# Patient Record
Sex: FEMALE | Race: WHITE | Employment: FULL TIME | URBAN - METROPOLITAN AREA
[De-identification: names, ages, dates, MRNs, and addresses within clinical notes are randomized per-mention and may not be internally consistent; named-entity substitution may affect disease eponyms.]

---

## 2022-06-23 ENCOUNTER — HOSPITAL ENCOUNTER (OUTPATIENT)
Age: 67
Discharge: HOME | End: 2022-06-23
Payer: COMMERCIAL

## 2022-06-23 DIAGNOSIS — C53.9 SQUAMOUS CELL CARCINOMA OF CERVIX (HCC): ICD-10-CM

## 2022-06-23 LAB
ANION GAP SERPL CALCULATED.3IONS-SCNC: 15 MMOL/L (ref 7–16)
BUN BLDV-MCNC: 36 MG/DL (ref 6–23)
CALCIUM SERPL-MCNC: 9.5 MG/DL (ref 8.6–10.2)
CHLORIDE BLD-SCNC: 98 MMOL/L (ref 98–107)
CO2: 22 MMOL/L (ref 22–29)
CREAT SERPL-MCNC: 2 MG/DL (ref 0.5–1)
GFR AFRICAN AMERICAN: 30
GFR NON-AFRICAN AMERICAN: 25 ML/MIN/1.73
GLUCOSE BLD-MCNC: 106 MG/DL (ref 74–99)
POTASSIUM SERPL-SCNC: 5 MMOL/L (ref 3.5–5)
SODIUM BLD-SCNC: 135 MMOL/L (ref 132–146)

## 2022-06-23 PROCEDURE — 80048 BASIC METABOLIC PNL TOTAL CA: CPT

## 2022-06-23 PROCEDURE — 36415 COLL VENOUS BLD VENIPUNCTURE: CPT

## 2022-06-30 ENCOUNTER — HOSPITAL ENCOUNTER (OUTPATIENT)
Age: 67
Discharge: HOME | End: 2022-06-30
Payer: COMMERCIAL

## 2022-06-30 ENCOUNTER — HOSPITAL ENCOUNTER (OUTPATIENT)
Dept: MRI IMAGING | Age: 67
Discharge: HOME | End: 2022-07-02
Payer: COMMERCIAL

## 2022-06-30 ENCOUNTER — HOSPITAL ENCOUNTER (OUTPATIENT)
Dept: PET IMAGING | Age: 67
Discharge: HOME | End: 2022-07-02
Payer: COMMERCIAL

## 2022-06-30 DIAGNOSIS — C53.9 SQUAMOUS CELL CARCINOMA OF CERVIX (HCC): ICD-10-CM

## 2022-06-30 LAB
BUN BLDV-MCNC: 34 MG/DL (ref 6–23)
CREAT SERPL-MCNC: 1.8 MG/DL (ref 0.5–1)
GFR AFRICAN AMERICAN: 34
GFR NON-AFRICAN AMERICAN: 28 ML/MIN/1.73
METER GLUCOSE: 92 MG/DL (ref 74–99)

## 2022-06-30 PROCEDURE — 84520 ASSAY OF UREA NITROGEN: CPT

## 2022-06-30 PROCEDURE — G1010 CDSM STANSON: HCPCS

## 2022-06-30 PROCEDURE — 82565 ASSAY OF CREATININE: CPT

## 2022-06-30 PROCEDURE — 36415 COLL VENOUS BLD VENIPUNCTURE: CPT

## 2022-06-30 PROCEDURE — 82962 GLUCOSE BLOOD TEST: CPT

## 2022-06-30 PROCEDURE — 3430000000 HC RX DIAGNOSTIC RADIOPHARMACEUTICAL: Performed by: RADIOLOGY

## 2022-06-30 PROCEDURE — A9552 F18 FDG: HCPCS | Performed by: RADIOLOGY

## 2022-06-30 RX ORDER — FLUDEOXYGLUCOSE F 18 200 MCI/ML
15 INJECTION, SOLUTION INTRAVENOUS
Status: COMPLETED | OUTPATIENT
Start: 2022-06-30 | End: 2022-06-30

## 2022-06-30 RX ADMIN — FLUDEOXYGLUCOSE F 18 15 MILLICURIE: 200 INJECTION, SOLUTION INTRAVENOUS at 12:28

## 2022-07-07 ENCOUNTER — HOSPITAL ENCOUNTER (OUTPATIENT)
Dept: MRI IMAGING | Age: 67
Discharge: HOME | End: 2022-07-09
Payer: COMMERCIAL

## 2022-07-07 VITALS — WEIGHT: 210.98 LBS | BODY MASS INDEX: 34.05 KG/M2

## 2022-07-07 DIAGNOSIS — C53.9 SQUAMOUS CELL CARCINOMA OF CERVIX (HCC): ICD-10-CM

## 2022-07-07 PROCEDURE — 72197 MRI PELVIS W/O & W/DYE: CPT

## 2022-07-07 PROCEDURE — 2580000003 HC RX 258

## 2022-07-07 PROCEDURE — A9577 INJ MULTIHANCE: HCPCS | Performed by: RADIOLOGY

## 2022-07-07 PROCEDURE — 6360000004 HC RX CONTRAST MEDICATION: Performed by: RADIOLOGY

## 2022-07-07 RX ORDER — 0.9 % SODIUM CHLORIDE 0.9 %
3 INTRAVENOUS SOLUTION INTRAVENOUS ONCE
Status: COMPLETED | OUTPATIENT
Start: 2022-07-07 | End: 2022-07-07

## 2022-07-07 RX ORDER — SODIUM CHLORIDE 9 MG/ML
INJECTION, SOLUTION INTRAVENOUS CONTINUOUS
Status: ACTIVE | OUTPATIENT
Start: 2022-07-07 | End: 2022-07-07

## 2022-07-07 RX ORDER — 0.9 % SODIUM CHLORIDE 0.9 %
2 INTRAVENOUS SOLUTION INTRAVENOUS ONCE
Status: DISCONTINUED | OUTPATIENT
Start: 2022-07-07 | End: 2022-07-07

## 2022-07-07 RX ADMIN — GADOBENATE DIMEGLUMINE 19 ML: 529 INJECTION, SOLUTION INTRAVENOUS at 12:30

## 2022-07-07 RX ADMIN — SODIUM CHLORIDE 287 ML: 9 INJECTION, SOLUTION INTRAVENOUS at 10:11

## 2022-07-07 RX ADMIN — SODIUM CHLORIDE: 9 INJECTION, SOLUTION INTRAVENOUS at 12:30

## 2022-07-07 NOTE — FLOWSHEET NOTE
Patient arrived from home to radiology for hydration prior to scheduled MRI. Patient weighed upon arrival and documented in the computer. Weight based bolus calculated. Iv inserted to right antecubital #20 gauge. Infusion started and call light given to patient. MRI notified that patient is here and infusion started.

## 2022-07-19 PROBLEM — Z98.890 POST-OPERATIVE STATE: Status: ACTIVE | Noted: 2022-07-19

## 2022-08-08 ENCOUNTER — HOSPITAL ENCOUNTER (OUTPATIENT)
Dept: RADIATION ONCOLOGY | Age: 67
Discharge: HOME | End: 2022-08-08
Payer: COMMERCIAL

## 2022-08-08 ENCOUNTER — TELEPHONE (OUTPATIENT)
Dept: CASE MANAGEMENT | Age: 67
End: 2022-08-08

## 2022-08-08 VITALS
OXYGEN SATURATION: 98 % | RESPIRATION RATE: 18 BRPM | TEMPERATURE: 97.5 F | HEART RATE: 96 BPM | BODY MASS INDEX: 33.6 KG/M2 | WEIGHT: 208.2 LBS

## 2022-08-08 PROCEDURE — 99204 OFFICE O/P NEW MOD 45 MIN: CPT | Performed by: RADIOLOGY

## 2022-08-08 PROCEDURE — 99205 OFFICE O/P NEW HI 60 MIN: CPT

## 2022-08-08 ASSESSMENT — PAIN DESCRIPTION - FREQUENCY: FREQUENCY: INTERMITTENT

## 2022-08-08 ASSESSMENT — PAIN DESCRIPTION - LOCATION: LOCATION: PELVIS

## 2022-08-08 ASSESSMENT — PAIN SCALES - GENERAL: PAINLEVEL_OUTOF10: 3

## 2022-08-08 ASSESSMENT — PAIN DESCRIPTION - DESCRIPTORS: DESCRIPTORS: TENDER

## 2022-08-08 NOTE — TELEPHONE ENCOUNTER
Met with patient and her daughter Janette Robert during her initial consultation with Dr. Raisa Lucas for radiation therapy for her cervical cancer diagnosis. Introduced myself and explained my role with cancer patients receiving treatment within our cancer centers. Patient was friendly and receptive. States that all of her questions and concerns were fully addressed during her consultation appointment with the radiation therapy nurse and physician. Denies any problems with insurance coverage for medication or transportation for the daily treatments. Reviewed additional ancillary services available at the center. Denies any referral needs at this time. Patient is to see Dr. Marcio Schofield to discuss possible concurrent chemotherapy, she is requesting to be seen in [place] [address]. I told her to expect a call from our staff within the next few days to set up this appointment. She is aware that she needs a CT Simulation scheduled. RT nurse Minnie Tillman is working to set that appointment up while patient is being seen for consult. Patient provided with written literature of ACS: Cervical Cancer, printouts from [facility] on Cervical Cancer and radiation therapy, [facility] handout, and . I told patient that I also work with Dr. Marcio Schofield and will continue to follow with her during those appointments and will be available for any questions or concerns she has. Provided patient with my contact information and office hours. Patient verbalizes understanding and appreciative of nurse navigator visit. Emotional support provided and greater than 25 minutes spent with patient. Nurse navigator will continue to follow.  EDGARDO LemaN, RN, Oncology Nurse Navigator

## 2022-08-08 NOTE — PROGRESS NOTES
Ignatius Harada  1955 78 y.o. Referring Physician: Dr. Mark Forrest    PCP: JULIO Car     Vitals:    22 0903   Pulse: 96   Resp: 18   Temp: 97.5 °F (36.4 °C)   SpO2: 98%        Wt Readings from Last 3 Encounters:   22 208 lb 3.2 oz (94.4 kg)   22 209 lb (94.8 kg)   22 207 lb (93.9 kg)        Body mass index is 33.6 kg/m². Chief Complaint: No chief complaint on file. Cancer Staging  No matching staging information was found for the patient. Prior Radiation Therapy? NO    Concurrent Chemo/radiation? NO    Prior Chemotherapy? NO    Prior Hormonal Therapy? NO    Head and Neck Cancer? No, patient does NOT have HN cancer. LMP: 47    Age at first Menses: 6    : 1    Para: 1        Current Outpatient Medications   Medication Sig Dispense Refill    acetaminophen (TYLENOL) 500 MG tablet Take 2 tablets by mouth every 6 hours as needed for Pain 60 tablet 0    docusate sodium (COLACE) 100 MG capsule Take 1 capsule by mouth 2 times daily as needed for Constipation 60 capsule 0    apixaban (ELIQUIS) 2.5 MG TABS tablet Take 1 tablet by mouth in the morning and 1 tablet before bedtime. Do all this for 28 days. 56 tablet 0    lisinopril-hydroCHLOROthiazide (PRINZIDE;ZESTORETIC) 20-12.5 MG per tablet Take 1 tablet by mouth daily      OMEPRAZOLE PO Take 20 mg by mouth daily      Levothyroxine Sodium (SYNTHROID PO) Take by mouth daily      NONFORMULARY at bedtime Sleeping Pill      ALPRAZolam (XANAX) 0.5 MG tablet Take 1 tablet by mouth every 8 hours as needed for Anxiety for up to 30 days.  30 tablet 0    lisinopril-hydroCHLOROthiazide (PRINZIDE;ZESTORETIC) 20-25 MG per tablet TAKE 1 TABLET BY MOUTH IN THE MORNING      omeprazole (PRILOSEC) 20 MG delayed release capsule TAKE 1 CAPSULE BY MOUTH IN THE MORNING BEFORE BREAKFAST      traZODone (DESYREL) 50 MG tablet       ALPRAZolam (XANAX) 0.25 MG tablet       levothyroxine (SYNTHROID) 50 MCG tablet Take 50 mcg by mouth Daily       No current facility-administered medications for this encounter. Past Medical History:   Diagnosis Date    Abnormal Pap smear of cervix     Acid reflux     Arthritis     Hemorrhage 1982    s/p vaginal delivery     History of blood transfusion     Hypertension     Skin cancer     Thyroid disease        Past Surgical History:   Procedure Laterality Date    SKIN CANCER EXCISION      THYROID SURGERY      part of thyroid removal    THYROIDECTOMY, PARTIAL  [id number]       Family History   Problem Relation Age of Onset    Cancer Mother         Lung    Lung Cancer Mother        Social History     Socioeconomic History    Marital status:      Spouse name: Not on file    Number of children: 1    Years of education: Not on file    Highest education level: Not on file   Occupational History    Not on file   Tobacco Use    Smoking status: Never    Smokeless tobacco: Never   Vaping Use    Vaping Use: Never used   Substance and Sexual Activity    Alcohol use: Never    Drug use: Never    Sexual activity: Not on file   Other Topics Concern    Not on file   Social History Narrative    ** Merged History Encounter **          Social Determinants of Health     Financial Resource Strain: Not on file   Food Insecurity: Not on file   Transportation Needs: Not on file   Physical Activity: Not on file   Stress: Not on file   Social Connections: Not on file   Intimate Partner Violence: Not on file   Housing Stability: Not on file           Occupation:   Retired:  NO        REVIEW OF SYSTEMS: <<For Level 5, 10 or more systems>>   Patient is seen in consult today with her daughter, Thomas Castillo to discuss RT for her cervical cancer. Patient states that her cervical cancer was found back in May from an abnormal pap smear done on 05/12/22 for which pathology came back positive for malignancy, invasive nonkeratinizing Squamous cell carcinoma.  Patient then had a PET scan done on 06/30/22 which showed focal within patient's reach  6. Chair/bed in low position, stretcher/bed with siderails up except when performing patient care activities  7. Educate patient/family/caregiver on falls prevention  8. Falls risk precaution (Yellow sticker Level III) placed on patient chart           MALNUTRITION RISK SCREENING ASSESSMENT    Instructions:  Assess the patient and enter the appropriate indicators that are present for nutrition risk identification. Total the numbers entered and assign a risk score. Follow the appropriate action for total score listed below. Assessment   Date  8/8/2022     Have you lost weight without trying? 0- No     Have you been eating poorly because of a decreased appetite? 0- No   3. Do you have a diagnosis of head and neck cancer? 0- No                                                                                    TOTAL 0          Score of 0-1: No action  Score 2 or greater:   For Non-Diabetic Patient: Recommend adding Ensure Complete 2 x daily and provide patient with Ensure wellness bag with coupons  For Diabetic Patient: Recommend adding Glucerna Shake 2 x daily and provide patient with Glucerna Wellness bag with coupons  Route to the dietitian via [address]    Are you having difficulty performing daily routine tasks due to fatigue or weakness (ie: bathing/showering, dressing, housework, meal prep, work, , etc): No     Do you have any arm flexibility/ROM restrictions, swelling or pain that limit activity: No     Any changes in memory, attention/focus that impact daily activities: No     Do you avoid participation in leisure/social activity due to weakness, fatigue or pain: No     ARE ANY OF THE ABOVE ARE ANSWERED YES: No          PT ASSESSMENT FOR REFERRAL    Have you had any recent falls in the past 2 months: No     Do you have difficulty going up/down stairs: No     Are you having difficulty walking: No     Do you often hold onto furniture/environmental supports or feel off balance when you are walking: No     Do you need to take rest breaks when you are walking: No     Any pain on a scale of 1-10 that limits your mobility: Yes 3/10    ARE ANY OF THE ABOVE ARE ANSWERED YES: Yes - but NO PT referral request sent due to patient refusal.                   PELVIC FLOOR DYSFUNCTION SCREENING ASSESSMENT    - Do you have any pelvic pain? Yes     - Do you have any fecal constipation or fecal incontinence? No     - Do you have any urinary incontinence or difficulty starting to urinate? No     ARE ANY OF THE ABOVE ARE ANSWERED YES: No          PREHAB AUDIOLOGY REFERRAL    - Is patient planned to receive Cisplatin? No. This patient is not planned to start Cisplatin. - Is patient planned to receive radiation therapy that may be directed toward auditory canals or nerves? No. Patient is not planned to start radiation therapy to auditory canals or nerves. - Is patient complaining of new onset hearing loss? No. Patient is not complaining of new onset hearing loss. Patient education given on RT to the Pelvis. The patient expresses understanding and acceptance of instructions.  Sol Guerra RN 8/8/2022 9:11 AM           Sol Guerra RN

## 2022-08-08 NOTE — PROGRESS NOTES
Radiation Oncology Consult Note         8/8/2022    Chase Spear  78 y.o.   1955    REFERRING PROVIDER: Nely Laird    PCP:  JULIO Smith    CHIEF COMPLAINT: Have a recent diagnosis of cervical cancer for which I underwent surgery, I am here to find out if I need radiation therapy as well. 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 2.5 cm in maximum dimension, intermediate risk, with 2 positive Sedlis factors (more than two third cervical stromal invasion, focal lymphovascular invasion)    STAGING: Cancer Staging  No matching staging information was found for the patient. HISTORY OF PRESENT ILLNESS: Ms. Chase Spear  is a 78y.o. year old female who in May had an abnormal Pap smear. She underwent a cervical biopsy on 6/3/2022 that shows an invasive nonkeratinizing squamous cell carcinoma, p16 positive. On 7/19/2022 the patient underwent radical hysterectomy with lymph ryley sampling under the care of of Dr. Augusto Muñoz. Pathology report shows a moderately differentiated squamous cell carcinoma invading at least into the middle third and closely approaching the outer third of the cervical stroma. There were 2 incidental parametrial lymph nodes negative for metastasis and 5 pelvic lymph nodes negative for metastasis as well. The tumor had maximum dimension of 2.5 cm. With depth of stromal invasion of 9 mm. Lymphovascular invasion was present, focal.  All margins were negative for invasive carcinoma, the closest margin to invasive carcinoma was the ectocervical, radial/circumferential and the distance from invasive carcinoma to closest margins was 4 mm. High-grade squamous intraepithelial lesion present at margin (ectocervical at 3:00). Pathological stage IB2, according to 2018 FIGO.   The patient had an uneventful postoperative period and was then referred to me by Dr. Augusto Muñoz lymphadenopathy. Musculoskeletal: Ambulates without assistance. Normal curvature of the spine. No gross muscle weakness. Muscle size, tone and strength are normal. No involuntary movements. Extremities:  No lower extremity edema, ulcerations, tenderness, varicosities or erythema. Coordination appears adequate. Sensory function appears intact. Skin:  Warm and dry. Examination of color, turgor and pigmentation was relatively normal. No bruises or skin rashes. Neurological/Psychiatric: General behavior, level of consciousness, thought content is normal. The patient's emotional status is normal.  Cranial nerves II-XII are grossly intact. RADIATION SAFETY AND ONCOLOGIC TREATMENT SUPPORT:    - Previous radiation history:  No  - History of autoimmune or connective tissue disease:  No  - Nutritional support/ PEG:  Not applicable  - Dental evaluation:  Not applicable  -  requested:  Not asked for.  - Oncology Nurse navigator requested:  - Transportation for daily treatment:  Self    TUMOR MARKERS: No results found for: PSA, CEA, , [id number],     IMAGING REVIEWED:  I personally reviewed the images of the preoperative MRI as well as of the staging PET/CT    PATHOLOGY REVIEWED:  DIAGNOSIS:     A. UTERUS, RADICAL HYSTERECTOMY AND BILATERAL SALPINGO-OOPHORECTOMY -   INVASIVE, MODERATELY DIFFERENTIATED SQUAMOUS CELL CARCINOMA OF THE   CERVIX   - SEE SUMMARY BELOW   - BACKGROUND UTERUS WITH ATROPHIC ENDOMETRIUM, INCIDENTAL ENDOMETRIAL   POLYP, LEIOMYOMATA, AND BENIGN FALLOPIAN TUBES AND OVARIES   - 2 INCIDENTAL PARAMETRIAL LYMPH NODES, NEGATIVE FOR METASTASIS (0/2)       B. RIGHT PELVIC LYMPH NODES, SAMPLING - 3 LYMPH NODES, NEGATIVE FOR   METASTASIS (0/3)       C.   LEFT PELVIC LYMPH NODES, SAMPLING - 2 LYMPH NODES, NEGATIVE FOR   METASTASIS (0/2)       SPECIMEN   Procedure: Radical hysterectomy   TUMOR   Tumor Size:  Greatest Dimension (Centimeters) - 2.5 cm   Histologic Type:  Squamous cell carcinoma, HPV-associated   Histologic Grade:  G2, moderately differentiated   Stromal Invasion   Depth of Stromal Invasion:  9 mm   Other Tissue / Organ Involvement:  Not identified   Lymphovascular Invasion:  Present, focal   MARGINS   Margin Status for Invasive Carcinoma: All margins negative for   invasive carcinoma   Closest Margin(s) to Invasive Carcinoma:  Ectocervical, Radial /   circumferential   Distance from Invasive Carcinoma to Closest Margin:  4 mm   Margin Status for HSIL or AIS:   High-grade squamous intraepithelial lesion (HSIL) present at   margin   Margin(s) Involved by HSIL:  Ectocervical - 3:00. Other   ectocervical margins are denuded and cannot be commented upon   REGIONAL LYMPH NODES   Regional Lymph Node Status: All regional lymph nodes negative for   tumor cells   Lymph Nodes Examined   Total Number of Pelvic Nodes Examined:  5   Number of Pelvic [place] Nodes Examined:  5   Total Number of Para-aortic Nodes Examined:  0   Regional Lymph Node Comment:  2 additional incidental parametrial   lymph nodes, negative for metastasis   PATHOLOGIC STAGE CLASSIFICATION (pTNM, AJCC 9th Version)   Reporting of pT, pN, and (when applicable) pM categories is based on   information available to the pathologist at the time the report is   issued. As per the AJCC (Chapter 1, 8th Ed.) it is the managing   physician's responsibility to establish the final pathologic stage   based upon all pertinent information, including but potentially not   limited to this pathology report.    pT Category:  pT1b2   Regional Lymph Node Modifier:  (sn)   pN Category:  pN0   FIGO STAGE   FIGO Stage (2018 FIGO Cancer Report):  IB2   REPRESENTATIVE TUMOR BLOCK(S):  A7     IMPRESSION:   The patient with an early stage p16 positive squamous cell carcinoma of the cervix with intermediate risk, due to the presence of 2 Sedlis factors, is a good candidate for course of adjuvant radiation therapy to the pelvis plus minus concurrent chemotherapy. DISCUSSION/PLAN:     I have discussed with the patient the goals of the adjuvant radiation therapy in terms of increasing the local control and the progression free survival in intermediate risk patient with 2 or more Sedlis risk factors. Then I have explained to the patient the side effects of adjuvant radiation therapy to the pelvis with or without the addition of concurrent chemotherapy. According to NCCN guidelines the addition of chemotherapy in this specific case has a level 2 of evidence, but given the relatively low toxicity of weekly cisplatin, many experts in the field tend to favor the combination with concurrent chemotherapy on the basis of the ongoing randomized trial  where the experimental arm of concurrent adjuvant chemoradiation is compared to the standard arm of adjuvant radiation therapy alone. I have discussed the [facility] [address] study as well as the characteristic of the ongoing  with the patient and her daughter. They have an appointment with medical oncology to further discuss the issue of concurrent chemotherapy, in the near future. As far as the radiation treatment is concerned, at the end of the discussion the patient and her daughter voiced understanding and were agreeable with the plan. The patient signed a consent. I am planning for 45 Gray at 1.8 Gray per fraction to the pelvis with or without concurrent weekly cisplatin. NCCN guidelines, version 2020 is used to help review treatment recommendations. Procedures and process involved with CT simulation and daily radiation treatments were explained. Toxicities, both expected and less common, were reviewed in detail. Questions were answered to their apparent satisfaction. Thank you for the opportunity to participate in multidisciplinary management of this remarkable and pleasant patient.       Cesario Cardoso MD    Department of Radiation Oncology  [address] [place] [address] [facility] [place]: [phone] ([facility]: [phone])  [address] [place] [address] [facility]: [phone] ([facility]: [phone])  [facility]) [facility]:  [phone] ([facility]:  [phone])

## 2022-08-10 ENCOUNTER — HOSPITAL ENCOUNTER (OUTPATIENT)
Dept: INFUSION THERAPY | Age: 67
Discharge: HOME | End: 2022-08-10
Payer: COMMERCIAL

## 2022-08-10 ENCOUNTER — OFFICE VISIT (OUTPATIENT)
Dept: ONCOLOGY | Age: 67
End: 2022-08-10
Payer: COMMERCIAL

## 2022-08-10 VITALS
HEART RATE: 105 BPM | SYSTOLIC BLOOD PRESSURE: 141 MMHG | TEMPERATURE: 97.6 F | WEIGHT: 207 LBS | BODY MASS INDEX: 33.27 KG/M2 | HEIGHT: 66 IN | OXYGEN SATURATION: 99 % | DIASTOLIC BLOOD PRESSURE: 77 MMHG

## 2022-08-10 DIAGNOSIS — C53.8 MALIGNANT NEOPLASM OF OVERLAPPING SITES OF CERVIX (HCC): Primary | ICD-10-CM

## 2022-08-10 DIAGNOSIS — C53.8 MALIGNANT NEOPLASM OF OVERLAPPING SITES OF CERVIX (HCC): ICD-10-CM

## 2022-08-10 LAB
ALBUMIN SERPL-MCNC: 4.5 G/DL (ref 3.5–5.2)
ALP BLD-CCNC: 77 U/L (ref 35–104)
ALT SERPL-CCNC: 12 U/L (ref 0–32)
ANION GAP SERPL CALCULATED.3IONS-SCNC: 15 MMOL/L (ref 7–16)
AST SERPL-CCNC: 14 U/L (ref 0–31)
BASOPHILS ABSOLUTE: 0.05 E9/L (ref 0–0.2)
BASOPHILS RELATIVE PERCENT: 0.5 % (ref 0–2)
BILIRUB SERPL-MCNC: 0.3 MG/DL (ref 0–1.2)
BUN BLDV-MCNC: 27 MG/DL (ref 6–23)
CALCIUM SERPL-MCNC: 10 MG/DL (ref 8.6–10.2)
CHLORIDE BLD-SCNC: 100 MMOL/L (ref 98–107)
CO2: 24 MMOL/L (ref 22–29)
CREAT SERPL-MCNC: 1.5 MG/DL (ref 0.5–1)
EOSINOPHILS ABSOLUTE: 0.21 E9/L (ref 0.05–0.5)
EOSINOPHILS RELATIVE PERCENT: 2.3 % (ref 0–6)
GFR AFRICAN AMERICAN: 42
GFR NON-AFRICAN AMERICAN: 35 ML/MIN/1.73
GLUCOSE BLD-MCNC: 102 MG/DL (ref 74–99)
HCT VFR BLD CALC: 37.3 % (ref 34–48)
HEMOGLOBIN: 12.2 G/DL (ref 11.5–15.5)
IMMATURE GRANULOCYTES #: 0.03 E9/L
IMMATURE GRANULOCYTES %: 0.3 % (ref 0–5)
LYMPHOCYTES ABSOLUTE: 2.46 E9/L (ref 1.5–4)
LYMPHOCYTES RELATIVE PERCENT: 26.6 % (ref 20–42)
MCH RBC QN AUTO: 30.3 PG (ref 26–35)
MCHC RBC AUTO-ENTMCNC: 32.7 % (ref 32–34.5)
MCV RBC AUTO: 92.8 FL (ref 80–99.9)
MONOCYTES ABSOLUTE: 0.56 E9/L (ref 0.1–0.95)
MONOCYTES RELATIVE PERCENT: 6.1 % (ref 2–12)
NEUTROPHILS ABSOLUTE: 5.93 E9/L (ref 1.8–7.3)
NEUTROPHILS RELATIVE PERCENT: 64.2 % (ref 43–80)
PDW BLD-RTO: 13.7 FL (ref 11.5–15)
PLATELET # BLD: 357 E9/L (ref 130–450)
PMV BLD AUTO: 10.9 FL (ref 7–12)
POTASSIUM SERPL-SCNC: 4.5 MMOL/L (ref 3.5–5)
RBC # BLD: 4.02 E12/L (ref 3.5–5.5)
SODIUM BLD-SCNC: 139 MMOL/L (ref 132–146)
TOTAL PROTEIN: 7.5 G/DL (ref 6.4–8.3)
WBC # BLD: 9.2 E9/L (ref 4.5–11.5)

## 2022-08-10 PROCEDURE — 99214 OFFICE O/P EST MOD 30 MIN: CPT | Performed by: INTERNAL MEDICINE

## 2022-08-10 PROCEDURE — 36415 COLL VENOUS BLD VENIPUNCTURE: CPT

## 2022-08-10 PROCEDURE — 85025 COMPLETE CBC W/AUTO DIFF WBC: CPT

## 2022-08-10 PROCEDURE — 80053 COMPREHEN METABOLIC PANEL: CPT

## 2022-08-10 PROCEDURE — 99214 OFFICE O/P EST MOD 30 MIN: CPT

## 2022-08-10 PROCEDURE — 99205 OFFICE O/P NEW HI 60 MIN: CPT | Performed by: INTERNAL MEDICINE

## 2022-08-10 PROCEDURE — 1123F ACP DISCUSS/DSCN MKR DOCD: CPT | Performed by: INTERNAL MEDICINE

## 2022-08-10 NOTE — PROGRESS NOTES
[id number] [facility] MEDICAL ONCOLOGY  [address] [place] [address]  [place] [address]  Dept: [phone]: [phone]  Attending Consult Note      Reason for Visit:   Cervical cancer. Referring Physician:  Karan Johnson MD    PCP:  JULIO Masters    History of Present Illness:     Mrs. Amparo Zarate is a very pleasant 60-year-old lady, with a past medical history significant for hypertension, GERD, hypothyroidism, and skin cancer, who had presented with an abnormal Pap smear, she had a cervical biopsy done on 6/3/2022, revealing invasive nonkeratinizing squamous cell carcinoma, p16 positive, the patient was referred to Dr. Sanna Donovan, she underwent on 7/19/2022 radical hysterectomy with bilateral salpingo-oophorectomy and bilateral pelvic lymphadenectomy, pathology was consistent with moderately differentiated squamous cell carcinoma, invading at least into the middle third, and close a portion of the outer third of the cervical stroma, maximum dimension of the tumor 2.5 cm, with depth of stromal invasion of 9 mm, lymphovascular invasion was present, focal, all margins were negative, the closest margin was the ectocervical, radial/circumferential and the distance from invasive carcinoma to closest margin is was 4 mm. High-grade squamous intraepithelial lesion was present at the margin. 2 premature lymph nodes were negative for metastasis, 5 pelvic lymph nodes were negative for metastasis. Final pathologic stage IB2. The patient had recovered well from the surgery. When she had blood work done on 6/23/2020, she was found to have a creatinine of 2, repeat creatinine from 7/20/2022 was 1.54. The patient was not aware of any kidney issues prior to that. Review of Systems;  CONSTITUTIONAL: No fever, chills. Good appetite and energy level. ENMT: Eyes: No diplopia; Nose: No epistaxis. Mouth: No sore throat.   RESPIRATORY: No hemoptysis, shortness of breath, cough. CARDIOVASCULAR: No chest pain, palpitations. GASTROINTESTINAL: No nausea/vomiting, abdominal pain, diarrhea/constipation. GENITOURINARY: No dysuria, urinary frequency, hematuria. NEURO: No syncope, presyncope, headache. Remainder:  ROS NEGATIVE    Past Medical History:      Diagnosis Date    Abnormal Pap smear of cervix     Acid reflux     Arthritis     Hemorrhage 1982    s/p vaginal delivery     History of blood transfusion     Hypertension     Skin cancer     Thyroid disease      Patient Active Problem List   Diagnosis    Post-operative state        Past Surgical History:      Procedure Laterality Date    SKIN CANCER EXCISION      THYROID SURGERY      part of thyroid removal    THYROIDECTOMY, PARTIAL  [id number]       Family History:  Family History   Problem Relation Age of Onset    Cancer Mother         Lung    Lung Cancer Mother        Medications:  Reviewed and reconciled.     Social History:  Social History     Socioeconomic History    Marital status:      Spouse name: Not on file    Number of children: 1    Years of education: Not on file    Highest education level: Not on file   Occupational History    Not on file   Tobacco Use    Smoking status: Never    Smokeless tobacco: Never   Vaping Use    Vaping Use: Never used   Substance and Sexual Activity    Alcohol use: Never    Drug use: Never    Sexual activity: Not on file   Other Topics Concern    Not on file   Social History Narrative    ** Merged History Encounter **          Social Determinants of Health     Financial Resource Strain: Not on file   Food Insecurity: Not on file   Transportation Needs: Not on file   Physical Activity: Not on file   Stress: Not on file   Social Connections: Not on file   Intimate Partner Violence: Not on file   Housing Stability: Not on file       Allergies:  No Known Allergies    Physical Exam:  BP (!) 141/77   Pulse (!) 105   Temp 97.6 °F (36.4 °C)   Ht 5' 6\" (1.676 m)   Wt 207 lb (93.9 kg) SpO2 99%   BMI 33.41 kg/m²   GENERAL: Alert, oriented x 3, not in acute distress. HEENT: PERRLA; EOMI. Oropharynx clear. NECK: Supple. No palpable cervical or supraclavicular lymphadenopathy. LUNGS: Good air entry bilaterally. No wheezing, crackles or rhonchi. CARDIOVASCULAR: Regular rate. No murmurs, rubs or gallops. ABDOMEN: Incisions are healing nicely. Soft. Non-tender, non-distended. Positive bowel sounds. EXTREMITIES: Without clubbing, cyanosis, or edema. NEUROLOGIC: No focal deficits. ECOG PS 1       Impression/Plan:      Mrs. Gamaliel Bosch is a very pleasant 50-year-old lady, with a past medical history significant for hypertension, GERD, hypothyroidism, and skin cancer, who had presented with an abnormal Pap smear, she had a cervical biopsy done on 6/3/2022, revealing invasive nonkeratinizing squamous cell carcinoma, p16 positive, the patient was referred to Dr. Majo Rachel, she underwent on 7/19/2022 radical hysterectomy with bilateral salpingo-oophorectomy and bilateral pelvic lymphadenectomy, pathology was consistent with moderately differentiated squamous cell carcinoma, invading at least into the middle third, and close a portion of the outer third of the cervical stroma, maximum dimension of the tumor 2.5 cm, with depth of stromal invasion of 9 mm, lymphovascular invasion was present, focal, all margins were negative, the closest margin was the ectocervical, radial/circumferential and the distance from invasive carcinoma to closest margin is was 4 mm. High-grade squamous intraepithelial lesion was present at the margin. 2 premature lymph nodes were negative for metastasis, 5 pelvic lymph nodes were negative for metastasis. Final pathologic stage IB2. The patient has a newly diagnosed cervical squamous cell carcinoma, p16 positive, she has intermediate risk disease, diagnosis and prognosis were discussed with the patient.   Due to the presence of lymphovascular invasion, middle one third stromal invasion and tumor size greater than 2 cm, adjuvant chemo RT is recommended, will repeat her creatinine, if it had normalized, the patient will be a candidate for weekly cisplatin, if there is no improvement on her kidney function, weekly Taxol for radiosensitization would be recommended. The side effects of both cisplatin and Taxol were reviewed with the patient. All of her questions were answered to her apparent satisfaction. RTC in 2-3 weeks. Thank you for allowing us to participate in the care of Mrs. Tin Mari.     Graham Azar MD   HEMATOLOGY/MEDICAL [address] MEDICAL ONCOLOGY  [address] [place] [address]  Dept: [address]: [phone]

## 2022-08-11 PROCEDURE — 77470 SPECIAL RADIATION TREATMENT: CPT | Performed by: RADIOLOGY

## 2022-08-13 PROBLEM — C53.8 MALIGNANT NEOPLASM OF OVERLAPPING SITES OF CERVIX (HCC): Status: ACTIVE | Noted: 2022-08-13

## 2022-08-17 ENCOUNTER — HOSPITAL ENCOUNTER (OUTPATIENT)
Dept: RADIATION ONCOLOGY | Age: 67
Discharge: HOME | End: 2022-08-17
Payer: COMMERCIAL

## 2022-08-17 DIAGNOSIS — C50.919 MALIGNANT NEOPLASM OF FEMALE BREAST, UNSPECIFIED ESTROGEN RECEPTOR STATUS, UNSPECIFIED LATERALITY, UNSPECIFIED SITE OF BREAST (HCC): Primary | ICD-10-CM

## 2022-08-17 PROCEDURE — 99999 PR OFFICE/OUTPT VISIT,PROCEDURE ONLY: CPT | Performed by: RADIOLOGY

## 2022-08-17 PROCEDURE — 77334 RADIATION TREATMENT AID(S): CPT | Performed by: RADIOLOGY

## 2022-08-17 NOTE — PROGRESS NOTES
Radiation Oncology          -chart reviewed  -sim imaging reviewed          Chad Newman.  Zoey uDdley MD [place] [address] Oncology  Cell: [phone]    [facility]:  [phone]   FAX: [phone]  [facility]:  [address] [place] [address] Avenue:    [phone]  [facility]:  [address] [place] [address] Road:  [phone]

## 2022-08-18 PROBLEM — Z98.890 POST-OPERATIVE STATE: Status: RESOLVED | Noted: 2022-07-19 | Resolved: 2022-08-18

## 2022-08-31 ENCOUNTER — HOSPITAL ENCOUNTER (OUTPATIENT)
Dept: RADIATION ONCOLOGY | Age: 67
Discharge: HOME | End: 2022-08-31
Payer: COMMERCIAL

## 2022-08-31 PROCEDURE — 77301 RADIOTHERAPY DOSE PLAN IMRT: CPT | Performed by: RADIOLOGY

## 2022-08-31 PROCEDURE — 77300 RADIATION THERAPY DOSE PLAN: CPT | Performed by: RADIOLOGY

## 2022-08-31 PROCEDURE — 77338 DESIGN MLC DEVICE FOR IMRT: CPT | Performed by: RADIOLOGY

## 2022-09-01 DIAGNOSIS — C53.8 MALIGNANT NEOPLASM OF OVERLAPPING SITES OF CERVIX (HCC): Primary | ICD-10-CM

## 2022-09-02 ENCOUNTER — TELEPHONE (OUTPATIENT)
Dept: CASE MANAGEMENT | Age: 67
End: 2022-09-02

## 2022-09-02 NOTE — TELEPHONE ENCOUNTER
FMLA paperwork completed. Physician review and signature received. Faxed with confirmation. Confirmation sheet and copy scanned to patient chart. Copy provided to patient if requested.  Marry REYNOSON, RN, Oncology Nurse Navigator

## 2022-09-06 ENCOUNTER — HOSPITAL ENCOUNTER (OUTPATIENT)
Dept: INFUSION THERAPY | Age: 67
Discharge: HOME | End: 2022-09-06
Payer: COMMERCIAL

## 2022-09-06 DIAGNOSIS — C53.8 MALIGNANT NEOPLASM OF OVERLAPPING SITES OF CERVIX (HCC): ICD-10-CM

## 2022-09-06 LAB
ALBUMIN SERPL-MCNC: 4.5 G/DL (ref 3.5–5.2)
ALP BLD-CCNC: 84 U/L (ref 35–104)
ALT SERPL-CCNC: 32 U/L (ref 0–32)
ANION GAP SERPL CALCULATED.3IONS-SCNC: 12 MMOL/L (ref 7–16)
AST SERPL-CCNC: 26 U/L (ref 0–31)
BASOPHILS ABSOLUTE: 0.04 E9/L (ref 0–0.2)
BASOPHILS RELATIVE PERCENT: 0.5 % (ref 0–2)
BILIRUB SERPL-MCNC: 0.3 MG/DL (ref 0–1.2)
BUN BLDV-MCNC: 21 MG/DL (ref 6–23)
CALCIUM SERPL-MCNC: 9.5 MG/DL (ref 8.6–10.2)
CHLORIDE BLD-SCNC: 98 MMOL/L (ref 98–107)
CO2: 24 MMOL/L (ref 22–29)
CREAT SERPL-MCNC: 1.3 MG/DL (ref 0.5–1)
EOSINOPHILS ABSOLUTE: 0.21 E9/L (ref 0.05–0.5)
EOSINOPHILS RELATIVE PERCENT: 2.4 % (ref 0–6)
GFR AFRICAN AMERICAN: 50
GFR NON-AFRICAN AMERICAN: 41 ML/MIN/1.73
GLUCOSE BLD-MCNC: 106 MG/DL (ref 74–99)
HCT VFR BLD CALC: 36.1 % (ref 34–48)
HEMOGLOBIN: 11.8 G/DL (ref 11.5–15.5)
IMMATURE GRANULOCYTES #: 0.02 E9/L
IMMATURE GRANULOCYTES %: 0.2 % (ref 0–5)
LYMPHOCYTES ABSOLUTE: 2.19 E9/L (ref 1.5–4)
LYMPHOCYTES RELATIVE PERCENT: 25.5 % (ref 20–42)
MCH RBC QN AUTO: 30.6 PG (ref 26–35)
MCHC RBC AUTO-ENTMCNC: 32.7 % (ref 32–34.5)
MCV RBC AUTO: 93.8 FL (ref 80–99.9)
MONOCYTES ABSOLUTE: 0.56 E9/L (ref 0.1–0.95)
MONOCYTES RELATIVE PERCENT: 6.5 % (ref 2–12)
NEUTROPHILS ABSOLUTE: 5.57 E9/L (ref 1.8–7.3)
NEUTROPHILS RELATIVE PERCENT: 64.9 % (ref 43–80)
PDW BLD-RTO: 13.5 FL (ref 11.5–15)
PLATELET # BLD: 297 E9/L (ref 130–450)
PMV BLD AUTO: 10.7 FL (ref 7–12)
POTASSIUM SERPL-SCNC: 4 MMOL/L (ref 3.5–5)
RBC # BLD: 3.85 E12/L (ref 3.5–5.5)
SODIUM BLD-SCNC: 134 MMOL/L (ref 132–146)
TOTAL PROTEIN: 7.3 G/DL (ref 6.4–8.3)
WBC # BLD: 8.6 E9/L (ref 4.5–11.5)

## 2022-09-06 PROCEDURE — 36415 COLL VENOUS BLD VENIPUNCTURE: CPT

## 2022-09-06 PROCEDURE — 85025 COMPLETE CBC W/AUTO DIFF WBC: CPT

## 2022-09-06 PROCEDURE — 80053 COMPREHEN METABOLIC PANEL: CPT

## 2022-09-07 ENCOUNTER — OFFICE VISIT (OUTPATIENT)
Dept: ONCOLOGY | Age: 67
End: 2022-09-07
Payer: COMMERCIAL

## 2022-09-07 ENCOUNTER — HOSPITAL ENCOUNTER (OUTPATIENT)
Dept: INFUSION THERAPY | Age: 67
Discharge: HOME | End: 2022-09-07
Payer: COMMERCIAL

## 2022-09-07 ENCOUNTER — HOSPITAL ENCOUNTER (OUTPATIENT)
Dept: RADIATION ONCOLOGY | Age: 67
Discharge: HOME | End: 2022-09-07
Payer: COMMERCIAL

## 2022-09-07 VITALS
RESPIRATION RATE: 18 BRPM | TEMPERATURE: 98 F | DIASTOLIC BLOOD PRESSURE: 82 MMHG | SYSTOLIC BLOOD PRESSURE: 140 MMHG | BODY MASS INDEX: 33.73 KG/M2 | WEIGHT: 209 LBS | HEART RATE: 95 BPM

## 2022-09-07 VITALS — SYSTOLIC BLOOD PRESSURE: 138 MMHG | DIASTOLIC BLOOD PRESSURE: 71 MMHG | HEART RATE: 79 BPM | RESPIRATION RATE: 18 BRPM

## 2022-09-07 VITALS
OXYGEN SATURATION: 98 % | TEMPERATURE: 97.6 F | HEART RATE: 111 BPM | WEIGHT: 208.6 LBS | SYSTOLIC BLOOD PRESSURE: 145 MMHG | BODY MASS INDEX: 33.52 KG/M2 | HEIGHT: 66 IN | DIASTOLIC BLOOD PRESSURE: 63 MMHG

## 2022-09-07 DIAGNOSIS — C53.8 MALIGNANT NEOPLASM OF OVERLAPPING SITES OF CERVIX (HCC): Primary | ICD-10-CM

## 2022-09-07 PROCEDURE — 77014 PR CT GUIDANCE PLACEMENT RAD THERAPY FIELDS: CPT | Performed by: RADIOLOGY

## 2022-09-07 PROCEDURE — 6360000002 HC RX W HCPCS: Performed by: INTERNAL MEDICINE

## 2022-09-07 PROCEDURE — 6360000002 HC RX W HCPCS

## 2022-09-07 PROCEDURE — 99214 OFFICE O/P EST MOD 30 MIN: CPT | Performed by: INTERNAL MEDICINE

## 2022-09-07 PROCEDURE — 96375 TX/PRO/DX INJ NEW DRUG ADDON: CPT

## 2022-09-07 PROCEDURE — 96413 CHEMO IV INFUSION 1 HR: CPT

## 2022-09-07 PROCEDURE — A4216 STERILE WATER/SALINE, 10 ML: HCPCS | Performed by: INTERNAL MEDICINE

## 2022-09-07 PROCEDURE — 2580000003 HC RX 258: Performed by: INTERNAL MEDICINE

## 2022-09-07 PROCEDURE — 77386 HC NTSTY MODUL RAD TX DLVR CPLX: CPT | Performed by: RADIOLOGY

## 2022-09-07 PROCEDURE — 2500000003 HC RX 250 WO HCPCS: Performed by: INTERNAL MEDICINE

## 2022-09-07 PROCEDURE — 1123F ACP DISCUSS/DSCN MKR DOCD: CPT | Performed by: INTERNAL MEDICINE

## 2022-09-07 RX ORDER — SODIUM CHLORIDE 9 MG/ML
INJECTION, SOLUTION INTRAVENOUS CONTINUOUS
Status: CANCELLED | OUTPATIENT
Start: 2022-09-07

## 2022-09-07 RX ORDER — HEPARIN SODIUM (PORCINE) LOCK FLUSH IV SOLN 100 UNIT/ML 100 UNIT/ML
500 SOLUTION INTRAVENOUS PRN
Status: CANCELLED | OUTPATIENT
Start: 2022-09-07

## 2022-09-07 RX ORDER — SODIUM CHLORIDE 0.9 % (FLUSH) 0.9 %
5-40 SYRINGE (ML) INJECTION PRN
Status: DISCONTINUED | OUTPATIENT
Start: 2022-09-07 | End: 2022-09-08 | Stop reason: HOSPADM

## 2022-09-07 RX ORDER — SODIUM CHLORIDE 9 MG/ML
5-250 INJECTION, SOLUTION INTRAVENOUS PRN
Status: CANCELLED | OUTPATIENT
Start: 2022-09-07

## 2022-09-07 RX ORDER — ACETAMINOPHEN 325 MG/1
650 TABLET ORAL
Status: CANCELLED | OUTPATIENT
Start: 2022-09-07

## 2022-09-07 RX ORDER — DIPHENHYDRAMINE HYDROCHLORIDE 50 MG/ML
50 INJECTION INTRAMUSCULAR; INTRAVENOUS ONCE
Status: CANCELLED | OUTPATIENT
Start: 2022-09-07 | End: 2022-09-07

## 2022-09-07 RX ORDER — DIPHENHYDRAMINE HYDROCHLORIDE 50 MG/ML
50 INJECTION INTRAMUSCULAR; INTRAVENOUS ONCE
Status: COMPLETED | OUTPATIENT
Start: 2022-09-07 | End: 2022-09-07

## 2022-09-07 RX ORDER — SODIUM CHLORIDE 0.9 % (FLUSH) 0.9 %
5-40 SYRINGE (ML) INJECTION PRN
Status: CANCELLED | OUTPATIENT
Start: 2022-09-07

## 2022-09-07 RX ORDER — ONDANSETRON 4 MG/1
8 TABLET, ORALLY DISINTEGRATING ORAL EVERY 8 HOURS PRN
Qty: 21 TABLET | Refills: 1 | Status: SHIPPED | OUTPATIENT
Start: 2022-09-07

## 2022-09-07 RX ORDER — DEXAMETHASONE SODIUM PHOSPHATE 10 MG/ML
10 INJECTION INTRAMUSCULAR; INTRAVENOUS ONCE
Status: COMPLETED | OUTPATIENT
Start: 2022-09-07 | End: 2022-09-07

## 2022-09-07 RX ORDER — SODIUM CHLORIDE 9 MG/ML
5-40 INJECTION INTRAVENOUS PRN
Status: CANCELLED | OUTPATIENT
Start: 2022-09-07

## 2022-09-07 RX ORDER — FAMOTIDINE 10 MG/ML
20 INJECTION, SOLUTION INTRAVENOUS
Status: CANCELLED | OUTPATIENT
Start: 2022-09-07

## 2022-09-07 RX ORDER — SODIUM CHLORIDE 9 MG/ML
5-250 INJECTION, SOLUTION INTRAVENOUS PRN
Status: DISCONTINUED | OUTPATIENT
Start: 2022-09-07 | End: 2022-09-08 | Stop reason: HOSPADM

## 2022-09-07 RX ORDER — DIPHENHYDRAMINE HYDROCHLORIDE 50 MG/ML
50 INJECTION INTRAMUSCULAR; INTRAVENOUS
Status: CANCELLED | OUTPATIENT
Start: 2022-09-07

## 2022-09-07 RX ORDER — ALBUTEROL SULFATE 90 UG/1
4 AEROSOL, METERED RESPIRATORY (INHALATION) PRN
Status: CANCELLED | OUTPATIENT
Start: 2022-09-07

## 2022-09-07 RX ORDER — EPINEPHRINE 1 MG/ML
0.3 INJECTION, SOLUTION, CONCENTRATE INTRAVENOUS PRN
Status: CANCELLED | OUTPATIENT
Start: 2022-09-07

## 2022-09-07 RX ORDER — ONDANSETRON 2 MG/ML
8 INJECTION INTRAMUSCULAR; INTRAVENOUS ONCE
Status: COMPLETED | OUTPATIENT
Start: 2022-09-07 | End: 2022-09-07

## 2022-09-07 RX ORDER — FAMOTIDINE 10 MG/ML
20 INJECTION, SOLUTION INTRAVENOUS ONCE
Status: CANCELLED | OUTPATIENT
Start: 2022-09-07 | End: 2022-09-07

## 2022-09-07 RX ORDER — ONDANSETRON 2 MG/ML
INJECTION INTRAMUSCULAR; INTRAVENOUS
Status: COMPLETED
Start: 2022-09-07 | End: 2022-09-07

## 2022-09-07 RX ORDER — PROCHLORPERAZINE MALEATE 10 MG
10 TABLET ORAL EVERY 6 HOURS PRN
Qty: 60 TABLET | Refills: 1 | Status: SHIPPED | OUTPATIENT
Start: 2022-09-07

## 2022-09-07 RX ORDER — ONDANSETRON 2 MG/ML
8 INJECTION INTRAMUSCULAR; INTRAVENOUS
Status: CANCELLED | OUTPATIENT
Start: 2022-09-07

## 2022-09-07 RX ORDER — MEPERIDINE HYDROCHLORIDE 50 MG/ML
12.5 INJECTION INTRAMUSCULAR; INTRAVENOUS; SUBCUTANEOUS PRN
Status: CANCELLED | OUTPATIENT
Start: 2022-09-07

## 2022-09-07 RX ADMIN — SODIUM CHLORIDE, PRESERVATIVE FREE 10 ML: 5 INJECTION INTRAVENOUS at 12:13

## 2022-09-07 RX ADMIN — ONDANSETRON 8 MG: 2 INJECTION INTRAMUSCULAR; INTRAVENOUS at 14:11

## 2022-09-07 RX ADMIN — FAMOTIDINE 20 MG: 10 INJECTION, SOLUTION INTRAVENOUS at 12:06

## 2022-09-07 RX ADMIN — SODIUM CHLORIDE, PRESERVATIVE FREE 10 ML: 5 INJECTION INTRAVENOUS at 12:20

## 2022-09-07 RX ADMIN — DIPHENHYDRAMINE HYDROCHLORIDE 50 MG: 50 INJECTION, SOLUTION INTRAMUSCULAR; INTRAVENOUS at 12:20

## 2022-09-07 RX ADMIN — SODIUM CHLORIDE 25 ML/HR: 9 INJECTION, SOLUTION INTRAVENOUS at 12:00

## 2022-09-07 RX ADMIN — SODIUM CHLORIDE, PRESERVATIVE FREE 10 ML: 5 INJECTION INTRAVENOUS at 11:57

## 2022-09-07 RX ADMIN — PACLITAXEL 102 MG: 6 INJECTION, SOLUTION, CONCENTRATE INTRAVENOUS at 12:50

## 2022-09-07 RX ADMIN — DEXAMETHASONE SODIUM PHOSPHATE 10 MG: 10 INJECTION INTRAMUSCULAR; INTRAVENOUS at 12:13

## 2022-09-07 RX ADMIN — SODIUM CHLORIDE, PRESERVATIVE FREE 10 ML: 5 INJECTION INTRAVENOUS at 14:12

## 2022-09-07 NOTE — PROGRESS NOTES
[address] [facility] MEDICAL ONCOLOGY  [address] [place] [address]  [place] [address]  Dept: [phone] Dex: [phone]  Attending progress note      Reason for Visit:   Cervical cancer. Referring Physician:  Nicholas Garland MD    PCP:  JULIO Baird    History of Present Illness:     Mrs. Juan Aguilar is a very pleasant 78-year-old lady, with a past medical history significant for hypertension, GERD, hypothyroidism, and skin cancer, who had presented with an abnormal Pap smear, she had a cervical biopsy done on 6/3/2022, revealing invasive nonkeratinizing squamous cell carcinoma, p16 positive, the patient was referred to Dr. Claude Escalante, she underwent on 7/19/2022 radical hysterectomy with bilateral salpingo-oophorectomy and bilateral pelvic lymphadenectomy, pathology was consistent with moderately differentiated squamous cell carcinoma, invading at least into the middle third, and close a portion of the outer third of the cervical stroma, maximum dimension of the tumor 2.5 cm, with depth of stromal invasion of 9 mm, lymphovascular invasion was present, focal, all margins were negative, the closest margin was the ectocervical, radial/circumferential and the distance from invasive carcinoma to closest margin is was 4 mm. High-grade squamous intraepithelial lesion was present at the margin. 2 premature lymph nodes were negative for metastasis, 5 pelvic lymph nodes were negative for metastasis. Final pathologic stage IB2. The patient had recovered well from the surgery. When she had blood work done on 6/23/2020, she was found to have a creatinine of 2, repeat creatinine from 7/20/2022 was 1.54. The patient was not aware of any kidney issues prior to that. Today 9/7/2022, the patient will be started on adjuvant chemo RT using weekly Taxol. She is feeling well. Review of Systems;  CONSTITUTIONAL: No fever, chills. Good appetite and energy level. ENMT: Eyes: No diplopia; Nose: No epistaxis. Mouth: No sore throat. RESPIRATORY: No hemoptysis, shortness of breath, cough. CARDIOVASCULAR: No chest pain, palpitations. GASTROINTESTINAL: No nausea/vomiting, abdominal pain, diarrhea/constipation. GENITOURINARY: No dysuria, urinary frequency, hematuria. NEURO: No syncope, presyncope, headache. Remainder:  ROS NEGATIVE    Past Medical History:      Diagnosis Date    Abnormal Pap smear of cervix     Acid reflux     Arthritis     Hemorrhage 1982    s/p vaginal delivery     History of blood transfusion     Hypertension     Skin cancer     Thyroid disease      Patient Active Problem List   Diagnosis    Malignant neoplasm of overlapping sites of cervix [facility])        Past Surgical History:      Procedure Laterality Date    SKIN CANCER EXCISION      THYROID SURGERY      part of thyroid removal    THYROIDECTOMY, PARTIAL  [id number]       Family History:  Family History   Problem Relation Age of Onset    Cancer Mother         Lung    Lung Cancer Mother        Medications:  Reviewed and reconciled.     Social History:  Social History     Socioeconomic History    Marital status:      Spouse name: Not on file    Number of children: 1    Years of education: Not on file    Highest education level: Not on file   Occupational History    Not on file   Tobacco Use    Smoking status: Never    Smokeless tobacco: Never   Vaping Use    Vaping Use: Never used   Substance and Sexual Activity    Alcohol use: Never    Drug use: Never    Sexual activity: Not on file   Other Topics Concern    Not on file   Social History Narrative    ** Merged History Encounter **          Social Determinants of Health     Financial Resource Strain: Not on file   Food Insecurity: Not on file   Transportation Needs: Not on file   Physical Activity: Not on file   Stress: Not on file   Social Connections: Not on file   Intimate Partner Violence: Not on file   Housing Stability: Not on file Allergies:  No Known Allergies    Physical Exam:  BP (!) 145/63   Pulse (!) 111   Temp 97.6 °F (36.4 °C)   Ht 5' 6\" (1.676 m)   Wt 208 lb 9.6 oz (94.6 kg)   SpO2 98%   BMI 33.67 kg/m²   GENERAL: Alert, oriented x 3, not in acute distress. HEENT: PERRLA; EOMI. Oropharynx clear. NECK: Supple. No palpable cervical or supraclavicular lymphadenopathy. LUNGS: Good air entry bilaterally. No wheezing, crackles or rhonchi. CARDIOVASCULAR: Regular rate. No murmurs, rubs or gallops. ABDOMEN: Incisions are healing nicely. Soft. Non-tender, non-distended. Positive bowel sounds. EXTREMITIES: Without clubbing, cyanosis, or edema. NEUROLOGIC: No focal deficits. ECOG PS 1       Impression/Plan:      Mrs. Ignacia Joy is a very pleasant 60-year-old lady, with a past medical history significant for hypertension, GERD, hypothyroidism, and skin cancer, who had presented with an abnormal Pap smear, she had a cervical biopsy done on 6/3/2022, revealing invasive nonkeratinizing squamous cell carcinoma, p16 positive, the patient was referred to Dr. Stephanie Stovall, she underwent on 7/19/2022 radical hysterectomy with bilateral salpingo-oophorectomy and bilateral pelvic lymphadenectomy, pathology was consistent with moderately differentiated squamous cell carcinoma, invading at least into the middle third, and close a portion of the outer third of the cervical stroma, maximum dimension of the tumor 2.5 cm, with depth of stromal invasion of 9 mm, lymphovascular invasion was present, focal, all margins were negative, the closest margin was the ectocervical, radial/circumferential and the distance from invasive carcinoma to closest margin is was 4 mm. High-grade squamous intraepithelial lesion was present at the margin. 2 premature lymph nodes were negative for metastasis, 5 pelvic lymph nodes were negative for metastasis. Final pathologic stage IB2.        The patient has a newly diagnosed cervical squamous cell carcinoma, p16 positive, she has intermediate risk disease, diagnosis and prognosis were discussed with the patient. Due to the presence of lymphovascular invasion, middle one third stromal invasion and tumor size greater than 2 cm, adjuvant chemo RT is recommended, we decided to treat with weekly Taxol for radiosensitization. Side effects were reviewed with the patient. Today 9/7/2022, the patient will be started on adjuvant chemo RT using weekly Taxol. She is doing well, labs reviewed, proceed with Taxol, cycle #1. I discussed with the patient referral to nephrology, she would like to see nephrologist and his Tomi Gear, she will let me know about the provider she would like to see when she returns for her next visit. RTC in 1 week. Thank you for allowing us to participate in the care of Mrs. Hira Powell.     José Miguel Jimenez MD   HEMATOLOGY/MEDICAL [address] MEDICAL ONCOLOGY  [address] [place] [address]  Dept: [phone] Dex: [phone]

## 2022-09-07 NOTE — PROGRESS NOTES
[1:42 PM] Chandan Massey,  RN, notified that patient is c/o nausea. Taxol has been infusing for about 40 min. Pt denies any other symptoms. Vitals stable. I have not stopped the infusion. Patient did have very small amt clear flem emesis. Does Dr. Nunu Flood want us to give her anything else for nausea? (now?, after infusion complete?) Patient will have Zofran Rx filled today. [1:43 PM] Timothy Chamberlain  She does not appear to be in any acute distress at this time, and patient states that she is able to tolerate the remainder of the infusion without me stopping it. Kitty states she will speak with Dr. Nunu Flood.

## 2022-09-07 NOTE — PROGRESS NOTES
DEPARTMENT OF RADIATION ONCOLOGY   ON TREATMENT VISIT       9/7/2022      NAME:  Loyce Spatz    YOB: 1955    DIAGNOSIS: pathological stage of pT1B2, pN0, M0, moderately differentiated squamous cell carcinoma of the cervix, SP open radical hysterectomy with lymph ryley sampling. 2.5 cm in maximum dimension, intermediate risk, with 2 positive Sedlis factors (more than two third cervical stromal invasion, focal lymphovascular invasion)     SUBJECTIVE:   Loyce Spatz has now received 180 cGy in 1 fractions directed to the pelvis. Past medical, surgical, social and family histories reviewed and updated as indicated. PAIN: No    ALLERGIES:  Patient has no known allergies. Current Outpatient Medications   Medication Sig Dispense Refill    prochlorperazine (COMPAZINE) 10 MG tablet Take 1 tablet by mouth every 6 hours as needed (nausea) 60 tablet 1    ondansetron (ZOFRAN-ODT) 4 MG disintegrating tablet Place 2 tablets under the tongue every 8 hours as needed for Nausea or Vomiting 21 tablet 1    acetaminophen (TYLENOL) 500 MG tablet Take 2 tablets by mouth every 6 hours as needed for Pain 60 tablet 0    lisinopril-hydroCHLOROthiazide (PRINZIDE;ZESTORETIC) 20-12.5 MG per tablet Take 1 tablet by mouth daily      OMEPRAZOLE PO Take 20 mg by mouth daily      Levothyroxine Sodium (SYNTHROID PO) Take by mouth daily      NONFORMULARY at bedtime Sleeping Pill      lisinopril-hydroCHLOROthiazide (PRINZIDE;ZESTORETIC) 20-25 MG per tablet TAKE 1 TABLET BY MOUTH IN THE MORNING      omeprazole (PRILOSEC) 20 MG delayed release capsule TAKE 1 CAPSULE BY MOUTH IN THE MORNING BEFORE BREAKFAST      traZODone (DESYREL) 50 MG tablet       ALPRAZolam (XANAX) 0.25 MG tablet       levothyroxine (SYNTHROID) 50 MCG tablet Take 50 mcg by mouth Daily       No current facility-administered medications for this encounter.      Facility-Administered Medications Ordered in Other Encounters   Medication Dose Route

## 2022-09-07 NOTE — FLOWSHEET NOTE
Patient tolerated infusion well. Patient alert and oriented x3. No distress noted. Vital signs stable. Patient denies any new or worsening pain. Patient educated on signs and symptoms of reaction to medication. Educated patient on possible side effects and treatment of medication. Zofran 8 mg given IV immediately prior to discharge due to lingering slight nausea. Patient verbalized understanding. Offered patient education an/or discharge material.  Patient declined. Patient denies any needs. All questions answered.

## 2022-09-07 NOTE — PROGRESS NOTES
Santosh Monae  9/7/2022  Wt Readings from Last 3 Encounters:   09/07/22 209 lb (94.8 kg)   09/07/22 208 lb 9.6 oz (94.6 kg)   08/10/22 207 lb (93.9 kg)     Body mass index is 33.73 kg/m². Treatment Area:Pelvis/Cervical cancer    Patient was seen today for weekly visit. Comfort Alteration  KPS:90%  Fatigue: None    Mucous Membrane Alteration  Drainage: No  Drainage Odor: No  Vaginal Bleeding: No    Nutritional Alteration  Anorexia: No  Nausea: Yes/ ist dose of chemotherapy prior to radiation  therapy  Vomiting: No     Elimination Alterations  Constipation: no  Diarrhea:  no  Urinary Frequency/Urgency: No  Urinary Retention: No  Dysuria: No  Urinary Incontinence: No  Proctitis: No    Skin Alteration   Sensation:no    Radiation Dermatitis:  none, educated re: moisturizing skin to RT site    Emotional  Coping: effective    Sexuality Alteration  na    Injury, potential bleeding or infection: na    Lab Results   Component Value Date    WBC 8.6 09/06/2022     09/06/2022         BP (!) 140/82 Comment: asymptomatic  Pulse 95   Temp 98 °F (36.7 °C) (Tympanic)   Resp 18   Wt 209 lb (94.8 kg)   BMI 33.73 kg/m²   BP within normal range?  yes     Assessment/Plan:  Completed 1/27 fractions; 180/4860 cGy    Dinora Mai RN

## 2022-09-08 ENCOUNTER — HOSPITAL ENCOUNTER (OUTPATIENT)
Dept: RADIATION ONCOLOGY | Age: 67
Discharge: HOME | End: 2022-09-08
Payer: COMMERCIAL

## 2022-09-08 DIAGNOSIS — C53.8 MALIGNANT NEOPLASM OF OVERLAPPING SITES OF CERVIX (HCC): Primary | ICD-10-CM

## 2022-09-08 DIAGNOSIS — L65.8 ALOPECIA DUE TO CYTOTOXIC DRUG: Primary | ICD-10-CM

## 2022-09-08 DIAGNOSIS — C53.8 MALIGNANT NEOPLASM OF OVERLAPPING SITES OF CERVIX (HCC): ICD-10-CM

## 2022-09-08 DIAGNOSIS — T45.1X5A ALOPECIA DUE TO CYTOTOXIC DRUG: Primary | ICD-10-CM

## 2022-09-08 PROCEDURE — 77386 HC NTSTY MODUL RAD TX DLVR CPLX: CPT | Performed by: RADIOLOGY

## 2022-09-08 PROCEDURE — 77014 PR CT GUIDANCE PLACEMENT RAD THERAPY FIELDS: CPT | Performed by: RADIOLOGY

## 2022-09-09 ENCOUNTER — HOSPITAL ENCOUNTER (OUTPATIENT)
Dept: RADIATION ONCOLOGY | Age: 67
Discharge: HOME | End: 2022-09-09
Payer: COMMERCIAL

## 2022-09-09 PROCEDURE — 77386 HC NTSTY MODUL RAD TX DLVR CPLX: CPT | Performed by: RADIOLOGY

## 2022-09-09 PROCEDURE — 77014 PR CT GUIDANCE PLACEMENT RAD THERAPY FIELDS: CPT | Performed by: RADIOLOGY

## 2022-09-12 ENCOUNTER — HOSPITAL ENCOUNTER (OUTPATIENT)
Dept: RADIATION ONCOLOGY | Age: 67
Discharge: HOME | End: 2022-09-12
Payer: COMMERCIAL

## 2022-09-12 PROCEDURE — 77427 RADIATION TX MANAGEMENT X5: CPT | Performed by: RADIOLOGY

## 2022-09-12 PROCEDURE — 77386 HC NTSTY MODUL RAD TX DLVR CPLX: CPT | Performed by: RADIOLOGY

## 2022-09-12 PROCEDURE — 77014 PR CT GUIDANCE PLACEMENT RAD THERAPY FIELDS: CPT | Performed by: RADIOLOGY

## 2022-09-13 ENCOUNTER — HOSPITAL ENCOUNTER (OUTPATIENT)
Dept: RADIATION ONCOLOGY | Age: 67
Discharge: HOME | End: 2022-09-13
Payer: COMMERCIAL

## 2022-09-13 ENCOUNTER — HOSPITAL ENCOUNTER (OUTPATIENT)
Dept: INFUSION THERAPY | Age: 67
Discharge: HOME | End: 2022-09-13
Payer: COMMERCIAL

## 2022-09-13 DIAGNOSIS — C53.8 MALIGNANT NEOPLASM OF OVERLAPPING SITES OF CERVIX (HCC): ICD-10-CM

## 2022-09-13 LAB
ALBUMIN SERPL-MCNC: 4.4 G/DL (ref 3.5–5.2)
ALP BLD-CCNC: 75 U/L (ref 35–104)
ALT SERPL-CCNC: 18 U/L (ref 0–32)
ANION GAP SERPL CALCULATED.3IONS-SCNC: 13 MMOL/L (ref 7–16)
AST SERPL-CCNC: 18 U/L (ref 0–31)
BASOPHILS ABSOLUTE: 0.03 E9/L (ref 0–0.2)
BASOPHILS RELATIVE PERCENT: 0.5 % (ref 0–2)
BILIRUB SERPL-MCNC: <0.2 MG/DL (ref 0–1.2)
BUN BLDV-MCNC: 30 MG/DL (ref 6–23)
CALCIUM SERPL-MCNC: 9.1 MG/DL (ref 8.6–10.2)
CHLORIDE BLD-SCNC: 98 MMOL/L (ref 98–107)
CO2: 20 MMOL/L (ref 22–29)
CREAT SERPL-MCNC: 1.6 MG/DL (ref 0.5–1)
EOSINOPHILS ABSOLUTE: 0.11 E9/L (ref 0.05–0.5)
EOSINOPHILS RELATIVE PERCENT: 1.8 % (ref 0–6)
GFR AFRICAN AMERICAN: 39
GFR NON-AFRICAN AMERICAN: 32 ML/MIN/1.73
GLUCOSE BLD-MCNC: 110 MG/DL (ref 74–99)
HCT VFR BLD CALC: 34.4 % (ref 34–48)
HEMOGLOBIN: 11.3 G/DL (ref 11.5–15.5)
IMMATURE GRANULOCYTES #: 0.03 E9/L
IMMATURE GRANULOCYTES %: 0.5 % (ref 0–5)
LYMPHOCYTES ABSOLUTE: 1.59 E9/L (ref 1.5–4)
LYMPHOCYTES RELATIVE PERCENT: 26 % (ref 20–42)
MCH RBC QN AUTO: 29.9 PG (ref 26–35)
MCHC RBC AUTO-ENTMCNC: 32.8 % (ref 32–34.5)
MCV RBC AUTO: 91 FL (ref 80–99.9)
MONOCYTES ABSOLUTE: 0.29 E9/L (ref 0.1–0.95)
MONOCYTES RELATIVE PERCENT: 4.7 % (ref 2–12)
NEUTROPHILS ABSOLUTE: 4.07 E9/L (ref 1.8–7.3)
NEUTROPHILS RELATIVE PERCENT: 66.5 % (ref 43–80)
PDW BLD-RTO: 13.3 FL (ref 11.5–15)
PLATELET # BLD: 263 E9/L (ref 130–450)
PMV BLD AUTO: 11 FL (ref 7–12)
POTASSIUM SERPL-SCNC: 4.6 MMOL/L (ref 3.5–5)
RBC # BLD: 3.78 E12/L (ref 3.5–5.5)
SODIUM BLD-SCNC: 131 MMOL/L (ref 132–146)
TOTAL PROTEIN: 7.1 G/DL (ref 6.4–8.3)
WBC # BLD: 6.1 E9/L (ref 4.5–11.5)

## 2022-09-13 PROCEDURE — 77386 HC NTSTY MODUL RAD TX DLVR CPLX: CPT | Performed by: RADIOLOGY

## 2022-09-13 PROCEDURE — 36415 COLL VENOUS BLD VENIPUNCTURE: CPT

## 2022-09-13 PROCEDURE — 77336 RADIATION PHYSICS CONSULT: CPT | Performed by: RADIOLOGY

## 2022-09-13 PROCEDURE — 80053 COMPREHEN METABOLIC PANEL: CPT

## 2022-09-13 PROCEDURE — 77014 PR CT GUIDANCE PLACEMENT RAD THERAPY FIELDS: CPT | Performed by: RADIOLOGY

## 2022-09-13 PROCEDURE — 85025 COMPLETE CBC W/AUTO DIFF WBC: CPT

## 2022-09-14 ENCOUNTER — HOSPITAL ENCOUNTER (OUTPATIENT)
Dept: RADIATION ONCOLOGY | Age: 67
Discharge: HOME | End: 2022-09-14
Payer: COMMERCIAL

## 2022-09-14 ENCOUNTER — HOSPITAL ENCOUNTER (OUTPATIENT)
Dept: INFUSION THERAPY | Age: 67
Discharge: HOME | End: 2022-09-14
Payer: COMMERCIAL

## 2022-09-14 ENCOUNTER — OFFICE VISIT (OUTPATIENT)
Dept: ONCOLOGY | Age: 67
End: 2022-09-14
Payer: COMMERCIAL

## 2022-09-14 VITALS
DIASTOLIC BLOOD PRESSURE: 62 MMHG | SYSTOLIC BLOOD PRESSURE: 125 MMHG | HEART RATE: 90 BPM | TEMPERATURE: 98 F | OXYGEN SATURATION: 98 %

## 2022-09-14 VITALS
BODY MASS INDEX: 33.2 KG/M2 | HEART RATE: 100 BPM | SYSTOLIC BLOOD PRESSURE: 140 MMHG | HEIGHT: 66 IN | TEMPERATURE: 97.6 F | DIASTOLIC BLOOD PRESSURE: 66 MMHG | RESPIRATION RATE: 99 BRPM | WEIGHT: 206.6 LBS

## 2022-09-14 DIAGNOSIS — N18.9 CHRONIC KIDNEY DISEASE, UNSPECIFIED CKD STAGE: Primary | ICD-10-CM

## 2022-09-14 DIAGNOSIS — C53.8 MALIGNANT NEOPLASM OF OVERLAPPING SITES OF CERVIX (HCC): Primary | ICD-10-CM

## 2022-09-14 PROCEDURE — 96375 TX/PRO/DX INJ NEW DRUG ADDON: CPT

## 2022-09-14 PROCEDURE — 6360000002 HC RX W HCPCS: Performed by: INTERNAL MEDICINE

## 2022-09-14 PROCEDURE — 96361 HYDRATE IV INFUSION ADD-ON: CPT

## 2022-09-14 PROCEDURE — 1123F ACP DISCUSS/DSCN MKR DOCD: CPT | Performed by: INTERNAL MEDICINE

## 2022-09-14 PROCEDURE — 77386 HC NTSTY MODUL RAD TX DLVR CPLX: CPT | Performed by: RADIOLOGY

## 2022-09-14 PROCEDURE — 2580000003 HC RX 258: Performed by: INTERNAL MEDICINE

## 2022-09-14 PROCEDURE — 2500000003 HC RX 250 WO HCPCS: Performed by: INTERNAL MEDICINE

## 2022-09-14 PROCEDURE — 99214 OFFICE O/P EST MOD 30 MIN: CPT | Performed by: INTERNAL MEDICINE

## 2022-09-14 PROCEDURE — 96413 CHEMO IV INFUSION 1 HR: CPT

## 2022-09-14 PROCEDURE — 77014 PR CT GUIDANCE PLACEMENT RAD THERAPY FIELDS: CPT | Performed by: RADIOLOGY

## 2022-09-14 PROCEDURE — A4216 STERILE WATER/SALINE, 10 ML: HCPCS | Performed by: INTERNAL MEDICINE

## 2022-09-14 RX ORDER — ONDANSETRON 2 MG/ML
8 INJECTION INTRAMUSCULAR; INTRAVENOUS ONCE
Status: CANCELLED
Start: 2022-09-14 | End: 2022-09-14

## 2022-09-14 RX ORDER — DIPHENHYDRAMINE HYDROCHLORIDE 50 MG/ML
50 INJECTION INTRAMUSCULAR; INTRAVENOUS
Status: CANCELLED | OUTPATIENT
Start: 2022-09-14

## 2022-09-14 RX ORDER — SODIUM CHLORIDE 0.9 % (FLUSH) 0.9 %
5-40 SYRINGE (ML) INJECTION PRN
Status: CANCELLED | OUTPATIENT
Start: 2022-09-14

## 2022-09-14 RX ORDER — ONDANSETRON 2 MG/ML
8 INJECTION INTRAMUSCULAR; INTRAVENOUS
Status: CANCELLED | OUTPATIENT
Start: 2022-09-14

## 2022-09-14 RX ORDER — FAMOTIDINE 10 MG/ML
20 INJECTION, SOLUTION INTRAVENOUS ONCE
Status: CANCELLED | OUTPATIENT
Start: 2022-09-14 | End: 2022-09-14

## 2022-09-14 RX ORDER — ONDANSETRON 2 MG/ML
8 INJECTION INTRAMUSCULAR; INTRAVENOUS ONCE
Status: COMPLETED | OUTPATIENT
Start: 2022-09-14 | End: 2022-09-14

## 2022-09-14 RX ORDER — 0.9 % SODIUM CHLORIDE 0.9 %
1000 INTRAVENOUS SOLUTION INTRAVENOUS ONCE
Status: COMPLETED | OUTPATIENT
Start: 2022-09-14 | End: 2022-09-14

## 2022-09-14 RX ORDER — SODIUM CHLORIDE 0.9 % (FLUSH) 0.9 %
5-40 SYRINGE (ML) INJECTION PRN
Status: DISCONTINUED | OUTPATIENT
Start: 2022-09-14 | End: 2022-09-15 | Stop reason: HOSPADM

## 2022-09-14 RX ORDER — FAMOTIDINE 10 MG/ML
20 INJECTION, SOLUTION INTRAVENOUS
Status: CANCELLED | OUTPATIENT
Start: 2022-09-14

## 2022-09-14 RX ORDER — SODIUM CHLORIDE 9 MG/ML
5-40 INJECTION INTRAVENOUS PRN
Status: CANCELLED | OUTPATIENT
Start: 2022-09-14

## 2022-09-14 RX ORDER — ACETAMINOPHEN 325 MG/1
650 TABLET ORAL
Status: CANCELLED | OUTPATIENT
Start: 2022-09-14

## 2022-09-14 RX ORDER — SODIUM CHLORIDE 9 MG/ML
INJECTION, SOLUTION INTRAVENOUS CONTINUOUS
Status: CANCELLED | OUTPATIENT
Start: 2022-09-14

## 2022-09-14 RX ORDER — SODIUM CHLORIDE 9 MG/ML
5-250 INJECTION, SOLUTION INTRAVENOUS PRN
Status: DISCONTINUED | OUTPATIENT
Start: 2022-09-14 | End: 2022-09-15 | Stop reason: HOSPADM

## 2022-09-14 RX ORDER — HEPARIN SODIUM (PORCINE) LOCK FLUSH IV SOLN 100 UNIT/ML 100 UNIT/ML
500 SOLUTION INTRAVENOUS PRN
Status: CANCELLED | OUTPATIENT
Start: 2022-09-14

## 2022-09-14 RX ORDER — SODIUM CHLORIDE 9 MG/ML
5-250 INJECTION, SOLUTION INTRAVENOUS PRN
Status: CANCELLED | OUTPATIENT
Start: 2022-09-14

## 2022-09-14 RX ORDER — ALBUTEROL SULFATE 90 UG/1
4 AEROSOL, METERED RESPIRATORY (INHALATION) PRN
Status: CANCELLED | OUTPATIENT
Start: 2022-09-14

## 2022-09-14 RX ORDER — DIPHENHYDRAMINE HYDROCHLORIDE 50 MG/ML
25 INJECTION INTRAMUSCULAR; INTRAVENOUS ONCE
Status: COMPLETED | OUTPATIENT
Start: 2022-09-14 | End: 2022-09-14

## 2022-09-14 RX ORDER — MEPERIDINE HYDROCHLORIDE 50 MG/ML
12.5 INJECTION INTRAMUSCULAR; INTRAVENOUS; SUBCUTANEOUS PRN
Status: CANCELLED | OUTPATIENT
Start: 2022-09-14

## 2022-09-14 RX ORDER — DEXAMETHASONE SODIUM PHOSPHATE 10 MG/ML
10 INJECTION INTRAMUSCULAR; INTRAVENOUS ONCE
Status: COMPLETED | OUTPATIENT
Start: 2022-09-14 | End: 2022-09-14

## 2022-09-14 RX ORDER — EPINEPHRINE 1 MG/ML
0.3 INJECTION, SOLUTION, CONCENTRATE INTRAVENOUS PRN
Status: CANCELLED | OUTPATIENT
Start: 2022-09-14

## 2022-09-14 RX ORDER — DIPHENHYDRAMINE HYDROCHLORIDE 50 MG/ML
50 INJECTION INTRAMUSCULAR; INTRAVENOUS ONCE
Status: CANCELLED | OUTPATIENT
Start: 2022-09-14 | End: 2022-09-14

## 2022-09-14 RX ADMIN — SODIUM CHLORIDE, PRESERVATIVE FREE 10 ML: 5 INJECTION INTRAVENOUS at 11:57

## 2022-09-14 RX ADMIN — FAMOTIDINE 20 MG: 10 INJECTION, SOLUTION INTRAVENOUS at 12:10

## 2022-09-14 RX ADMIN — DEXAMETHASONE SODIUM PHOSPHATE 10 MG: 10 INJECTION INTRAMUSCULAR; INTRAVENOUS at 12:18

## 2022-09-14 RX ADMIN — SODIUM CHLORIDE, PRESERVATIVE FREE 10 ML: 5 INJECTION INTRAVENOUS at 12:06

## 2022-09-14 RX ADMIN — ONDANSETRON 8 MG: 2 INJECTION INTRAMUSCULAR; INTRAVENOUS at 12:06

## 2022-09-14 RX ADMIN — SODIUM CHLORIDE, PRESERVATIVE FREE 10 ML: 5 INJECTION INTRAVENOUS at 12:14

## 2022-09-14 RX ADMIN — DIPHENHYDRAMINE HYDROCHLORIDE 25 MG: 50 INJECTION, SOLUTION INTRAMUSCULAR; INTRAVENOUS at 12:14

## 2022-09-14 RX ADMIN — SODIUM CHLORIDE, PRESERVATIVE FREE 10 ML: 5 INJECTION INTRAVENOUS at 12:19

## 2022-09-14 RX ADMIN — SODIUM CHLORIDE 1000 ML: 9 INJECTION, SOLUTION INTRAVENOUS at 11:57

## 2022-09-14 RX ADMIN — PACLITAXEL 102 MG: 6 INJECTION, SOLUTION, CONCENTRATE INTRAVENOUS at 12:44

## 2022-09-14 NOTE — PROGRESS NOTES
[address] [facility] MEDICAL ONCOLOGY  [address] [place] [address]  [place] [address]  Dept: [phone] Dex: [phone]  Attending progress note      Reason for Visit:   Cervical cancer. Referring Physician:  Manpreet Gant MD    PCP:  JULIO Mckeon    History of Present Illness:     Mrs. Angela Melgar is a very pleasant 71-year-old lady, with a past medical history significant for hypertension, GERD, hypothyroidism, and skin cancer, who had presented with an abnormal Pap smear, she had a cervical biopsy done on 6/3/2022, revealing invasive nonkeratinizing squamous cell carcinoma, p16 positive, the patient was referred to Dr. Kei Kinsey, she underwent on 7/19/2022 radical hysterectomy with bilateral salpingo-oophorectomy and bilateral pelvic lymphadenectomy, pathology was consistent with moderately differentiated squamous cell carcinoma, invading at least into the middle third, and close a portion of the outer third of the cervical stroma, maximum dimension of the tumor 2.5 cm, with depth of stromal invasion of 9 mm, lymphovascular invasion was present, focal, all margins were negative, the closest margin was the ectocervical, radial/circumferential and the distance from invasive carcinoma to closest margin is was 4 mm. High-grade squamous intraepithelial lesion was present at the margin. 2 premature lymph nodes were negative for metastasis, 5 pelvic lymph nodes were negative for metastasis. Final pathologic stage IB2. The patient had recovered well from the surgery. When she had blood work done on 6/23/2020, she was found to have a creatinine of 2, repeat creatinine from 7/20/2022 was 1.54. The patient was not aware of any kidney issues prior to that. On 9/7/2022, the patient was started on adjuvant chemo RT using weekly Taxol. She had some nausea, took the oral antiemetics with good effect. Review of Systems;  CONSTITUTIONAL: No fever, chills. Good appetite and energy level. ENMT: Eyes: No diplopia; Nose: No epistaxis. Mouth: No sore throat. RESPIRATORY: No hemoptysis, shortness of breath, cough. CARDIOVASCULAR: No chest pain, palpitations. GASTROINTESTINAL: Positive for nausea, no vomiting, no abdominal pain, diarrhea/constipation. GENITOURINARY: No dysuria, urinary frequency, hematuria. NEURO: No syncope, presyncope, headache. Remainder:  ROS NEGATIVE    Past Medical History:      Diagnosis Date    Abnormal Pap smear of cervix     Acid reflux     Arthritis     Hemorrhage 1982    s/p vaginal delivery     History of blood transfusion     Hypertension     Skin cancer     Thyroid disease      Patient Active Problem List   Diagnosis    Malignant neoplasm of overlapping sites of cervix [facility])        Past Surgical History:      Procedure Laterality Date    SKIN CANCER EXCISION      THYROID SURGERY      part of thyroid removal    THYROIDECTOMY, PARTIAL  [id number]       Family History:  Family History   Problem Relation Age of Onset    Cancer Mother         Lung    Lung Cancer Mother        Medications:  Reviewed and reconciled.     Social History:  Social History     Socioeconomic History    Marital status:      Spouse name: Not on file    Number of children: 1    Years of education: Not on file    Highest education level: Not on file   Occupational History    Not on file   Tobacco Use    Smoking status: Never    Smokeless tobacco: Never   Vaping Use    Vaping Use: Never used   Substance and Sexual Activity    Alcohol use: Never    Drug use: Never    Sexual activity: Not on file   Other Topics Concern    Not on file   Social History Narrative    ** Merged History Encounter **          Social Determinants of Health     Financial Resource Strain: Not on file   Food Insecurity: Not on file   Transportation Needs: Not on file   Physical Activity: Not on file   Stress: Not on file   Social Connections: Not on file   Intimate Partner Violence: Not on file   Housing Stability: Not on file       Allergies:  No Known Allergies    Physical Exam:  BP (!) 140/66   Pulse 100   Temp 97.6 °F (36.4 °C)   Resp (!) 99   Ht 5' 6\" (1.676 m)   Wt 206 lb 9.6 oz (93.7 kg)   BMI 33.35 kg/m²   GENERAL: Alert, oriented x 3, not in acute distress. HEENT: PERRLA; EOMI. Oropharynx clear. NECK: Supple. No palpable cervical or supraclavicular lymphadenopathy. LUNGS: Good air entry bilaterally. No wheezing, crackles or rhonchi. CARDIOVASCULAR: Regular rate. No murmurs, rubs or gallops. ABDOMEN: Incisions are healing nicely. Soft. Non-tender, non-distended. Positive bowel sounds. EXTREMITIES: Without clubbing, cyanosis, or edema. NEUROLOGIC: No focal deficits. ECOG PS 1       Impression/Plan:      Mrs. Gilford Sievert is a very pleasant 61-year-old lady, with a past medical history significant for hypertension, GERD, hypothyroidism, and skin cancer, who had presented with an abnormal Pap smear, she had a cervical biopsy done on 6/3/2022, revealing invasive nonkeratinizing squamous cell carcinoma, p16 positive, the patient was referred to Dr. Jose L Mendoza, she underwent on 7/19/2022 radical hysterectomy with bilateral salpingo-oophorectomy and bilateral pelvic lymphadenectomy, pathology was consistent with moderately differentiated squamous cell carcinoma, invading at least into the middle third, and close a portion of the outer third of the cervical stroma, maximum dimension of the tumor 2.5 cm, with depth of stromal invasion of 9 mm, lymphovascular invasion was present, focal, all margins were negative, the closest margin was the ectocervical, radial/circumferential and the distance from invasive carcinoma to closest margin is was 4 mm. High-grade squamous intraepithelial lesion was present at the margin. 2 premature lymph nodes were negative for metastasis, 5 pelvic lymph nodes were negative for metastasis. Final pathologic stage IB2.        The patient has a newly diagnosed cervical squamous cell carcinoma, p16 positive, she has intermediate risk disease, diagnosis and prognosis were discussed with the patient. Due to the presence of lymphovascular invasion, middle one third stromal invasion and tumor size greater than 2 cm, adjuvant chemo RT is recommended, due to the CKD, did not recommend cisplatin, we decided to treat with weekly Taxol for radiosensitization. Side effects were reviewed with the patient. On 9/7/2022, the patient was started on adjuvant chemo RT using weekly Taxol. She had some nausea, took the oral antiemetics with good effect. Today 9/14/2022, the patient is doing well clinically, labs reviewed, blood counts adequate for treatment, proceed with Taxol, cycle #2, creatinine is 1.6, she will receive intravenous hydration, will increase her oral fluid intake, referral was placed to nephrology. RTC in 1 week. Thank you for allowing us to participate in the care of Mrs. Buck Boss.     Roxy Martinez MD   HEMATOLOGY/MEDICAL [address] MEDICAL ONCOLOGY  [address] [place] [address]  Dept: [phone] Dex: [phone]

## 2022-09-15 ENCOUNTER — HOSPITAL ENCOUNTER (OUTPATIENT)
Dept: RADIATION ONCOLOGY | Age: 67
Discharge: HOME | End: 2022-09-15
Payer: COMMERCIAL

## 2022-09-15 VITALS
SYSTOLIC BLOOD PRESSURE: 140 MMHG | HEART RATE: 98 BPM | DIASTOLIC BLOOD PRESSURE: 83 MMHG | BODY MASS INDEX: 33.65 KG/M2 | WEIGHT: 208.5 LBS | TEMPERATURE: 97.5 F

## 2022-09-15 DIAGNOSIS — C53.8 MALIGNANT NEOPLASM OF OVERLAPPING SITES OF CERVIX (HCC): Primary | ICD-10-CM

## 2022-09-15 PROCEDURE — 77014 PR CT GUIDANCE PLACEMENT RAD THERAPY FIELDS: CPT | Performed by: RADIOLOGY

## 2022-09-15 PROCEDURE — 77386 HC NTSTY MODUL RAD TX DLVR CPLX: CPT | Performed by: RADIOLOGY

## 2022-09-15 NOTE — PROGRESS NOTES
Sahil Vallecillo  9/15/2022  3:28 PM      No chief complaint on file. Wt Readings from Last 3 Encounters:   09/15/22 208 lb 8 oz (94.6 kg)   09/14/22 206 lb 9.6 oz (93.7 kg)   09/07/22 209 lb (94.8 kg)       Comments: Dose 1260 cGy to the pelvis. Patient is doing well without any complaints. She denies any abdominal pain. She has no diarrhea or rectal bleeding. She has no dysuria, frequency or hematuria. She has no vaginal bleeding or discharge. On examination the skin over the treated area looks good. Patient is tolerating treatment well.       Plan: Radiation to continue as planned      Electronically signed by Nancy Vera MD on 9/15/22 at 3:28 PM EDT

## 2022-09-15 NOTE — PROGRESS NOTES
Dakota Ferrer  9/15/2022  Wt Readings from Last 3 Encounters:   09/15/22 208 lb 8 oz (94.6 kg)   09/14/22 206 lb 9.6 oz (93.7 kg)   09/07/22 209 lb (94.8 kg)     Body mass index is 33.65 kg/m². Treatment Area:Pelvis    Patient was seen today for weekly visit. Comfort Alteration  KPS:90%  Fatigue: Mild    Mucous Membrane Alteration  Drainage: No  Drainage Odor: No  Vaginal Bleeding: No    Nutritional Alteration  Anorexia: No  Nausea: No  Vomiting: No     Elimination Alterations  Constipation: no  Diarrhea:  no  Urinary Frequency/Urgency: No  Urinary Retention: No  Dysuria: No  Urinary Incontinence: No  Proctitis: No    Skin Alteration   Sensation:none    Radiation Dermatitis:  none    Emotional  Coping: effective    Sexuality Alteration  na    Injury, potential bleeding or infection: na    Lab Results   Component Value Date    WBC 6.1 09/13/2022     09/13/2022         BP (!) 140/83   Pulse 98   Temp 97.5 °F (36.4 °C) (Tympanic)   Wt 208 lb 8 oz (94.6 kg)   BMI 33.65 kg/m²   BP within normal range?  yes       Assessment/Plan:  Completed 7/27 fractions; 1260/4860 cGy    Dilma Preston RN

## 2022-09-16 ENCOUNTER — HOSPITAL ENCOUNTER (OUTPATIENT)
Dept: RADIATION ONCOLOGY | Age: 67
Discharge: HOME | End: 2022-09-16
Payer: COMMERCIAL

## 2022-09-16 PROCEDURE — 77014 PR CT GUIDANCE PLACEMENT RAD THERAPY FIELDS: CPT | Performed by: RADIOLOGY

## 2022-09-16 PROCEDURE — 77386 HC NTSTY MODUL RAD TX DLVR CPLX: CPT | Performed by: RADIOLOGY

## 2022-09-19 ENCOUNTER — HOSPITAL ENCOUNTER (OUTPATIENT)
Dept: RADIATION ONCOLOGY | Age: 67
Discharge: HOME | End: 2022-09-19
Payer: COMMERCIAL

## 2022-09-19 PROCEDURE — 77427 RADIATION TX MANAGEMENT X5: CPT | Performed by: RADIOLOGY

## 2022-09-19 PROCEDURE — 77386 HC NTSTY MODUL RAD TX DLVR CPLX: CPT | Performed by: RADIOLOGY

## 2022-09-19 PROCEDURE — 77014 PR CT GUIDANCE PLACEMENT RAD THERAPY FIELDS: CPT | Performed by: RADIOLOGY

## 2022-09-20 ENCOUNTER — HOSPITAL ENCOUNTER (OUTPATIENT)
Dept: INFUSION THERAPY | Age: 67
Discharge: HOME | End: 2022-09-20
Payer: COMMERCIAL

## 2022-09-20 ENCOUNTER — HOSPITAL ENCOUNTER (OUTPATIENT)
Dept: RADIATION ONCOLOGY | Age: 67
Discharge: HOME | End: 2022-09-20
Payer: COMMERCIAL

## 2022-09-20 DIAGNOSIS — C53.8 MALIGNANT NEOPLASM OF OVERLAPPING SITES OF CERVIX (HCC): ICD-10-CM

## 2022-09-20 LAB
ALBUMIN SERPL-MCNC: 4.3 G/DL (ref 3.5–5.2)
ALP BLD-CCNC: 66 U/L (ref 35–104)
ALT SERPL-CCNC: 22 U/L (ref 0–32)
ANION GAP SERPL CALCULATED.3IONS-SCNC: 14 MMOL/L (ref 7–16)
AST SERPL-CCNC: 18 U/L (ref 0–31)
BASOPHILS ABSOLUTE: 0.02 E9/L (ref 0–0.2)
BASOPHILS RELATIVE PERCENT: 0.4 % (ref 0–2)
BILIRUB SERPL-MCNC: 0.3 MG/DL (ref 0–1.2)
BUN BLDV-MCNC: 31 MG/DL (ref 6–23)
CALCIUM SERPL-MCNC: 9.4 MG/DL (ref 8.6–10.2)
CHLORIDE BLD-SCNC: 92 MMOL/L (ref 98–107)
CO2: 19 MMOL/L (ref 22–29)
CREAT SERPL-MCNC: 1.6 MG/DL (ref 0.5–1)
EOSINOPHILS ABSOLUTE: 0.22 E9/L (ref 0.05–0.5)
EOSINOPHILS RELATIVE PERCENT: 3.9 % (ref 0–6)
GFR AFRICAN AMERICAN: 39
GFR NON-AFRICAN AMERICAN: 32 ML/MIN/1.73
GLUCOSE BLD-MCNC: 99 MG/DL (ref 74–99)
HCT VFR BLD CALC: 33.7 % (ref 34–48)
HEMOGLOBIN: 11.6 G/DL (ref 11.5–15.5)
IMMATURE GRANULOCYTES #: 0.03 E9/L
IMMATURE GRANULOCYTES %: 0.5 % (ref 0–5)
LYMPHOCYTES ABSOLUTE: 1.12 E9/L (ref 1.5–4)
LYMPHOCYTES RELATIVE PERCENT: 20 % (ref 20–42)
MCH RBC QN AUTO: 30.7 PG (ref 26–35)
MCHC RBC AUTO-ENTMCNC: 34.4 % (ref 32–34.5)
MCV RBC AUTO: 89.2 FL (ref 80–99.9)
MONOCYTES ABSOLUTE: 0.32 E9/L (ref 0.1–0.95)
MONOCYTES RELATIVE PERCENT: 5.7 % (ref 2–12)
NEUTROPHILS ABSOLUTE: 3.9 E9/L (ref 1.8–7.3)
NEUTROPHILS RELATIVE PERCENT: 69.5 % (ref 43–80)
PDW BLD-RTO: 13.1 FL (ref 11.5–15)
PLATELET # BLD: 237 E9/L (ref 130–450)
PMV BLD AUTO: 10.7 FL (ref 7–12)
POTASSIUM SERPL-SCNC: 4.1 MMOL/L (ref 3.5–5)
RBC # BLD: 3.78 E12/L (ref 3.5–5.5)
SODIUM BLD-SCNC: 125 MMOL/L (ref 132–146)
TOTAL PROTEIN: 6.8 G/DL (ref 6.4–8.3)
WBC # BLD: 5.6 E9/L (ref 4.5–11.5)

## 2022-09-20 PROCEDURE — 77386 HC NTSTY MODUL RAD TX DLVR CPLX: CPT | Performed by: RADIOLOGY

## 2022-09-20 PROCEDURE — 80053 COMPREHEN METABOLIC PANEL: CPT

## 2022-09-20 PROCEDURE — 77336 RADIATION PHYSICS CONSULT: CPT | Performed by: RADIOLOGY

## 2022-09-20 PROCEDURE — 85025 COMPLETE CBC W/AUTO DIFF WBC: CPT

## 2022-09-20 PROCEDURE — 36415 COLL VENOUS BLD VENIPUNCTURE: CPT

## 2022-09-20 PROCEDURE — 77014 PR CT GUIDANCE PLACEMENT RAD THERAPY FIELDS: CPT | Performed by: RADIOLOGY

## 2022-09-21 ENCOUNTER — HOSPITAL ENCOUNTER (OUTPATIENT)
Dept: INFUSION THERAPY | Age: 67
Discharge: HOME | End: 2022-09-21
Payer: COMMERCIAL

## 2022-09-21 ENCOUNTER — HOSPITAL ENCOUNTER (OUTPATIENT)
Dept: RADIATION ONCOLOGY | Age: 67
Discharge: HOME | End: 2022-09-21
Payer: COMMERCIAL

## 2022-09-21 ENCOUNTER — OFFICE VISIT (OUTPATIENT)
Dept: ONCOLOGY | Age: 67
End: 2022-09-21
Payer: COMMERCIAL

## 2022-09-21 ENCOUNTER — TELEPHONE (OUTPATIENT)
Dept: INFUSION THERAPY | Age: 67
End: 2022-09-21

## 2022-09-21 ENCOUNTER — TELEPHONE (OUTPATIENT)
Dept: CASE MANAGEMENT | Age: 67
End: 2022-09-21

## 2022-09-21 VITALS
SYSTOLIC BLOOD PRESSURE: 118 MMHG | RESPIRATION RATE: 18 BRPM | OXYGEN SATURATION: 97 % | HEART RATE: 95 BPM | WEIGHT: 209.2 LBS | DIASTOLIC BLOOD PRESSURE: 76 MMHG | BODY MASS INDEX: 33.77 KG/M2 | TEMPERATURE: 97.7 F

## 2022-09-21 VITALS
HEART RATE: 100 BPM | BODY MASS INDEX: 33.62 KG/M2 | OXYGEN SATURATION: 99 % | RESPIRATION RATE: 18 BRPM | TEMPERATURE: 96.7 F | DIASTOLIC BLOOD PRESSURE: 61 MMHG | HEIGHT: 66 IN | SYSTOLIC BLOOD PRESSURE: 129 MMHG | WEIGHT: 209.2 LBS

## 2022-09-21 VITALS
DIASTOLIC BLOOD PRESSURE: 62 MMHG | TEMPERATURE: 97 F | SYSTOLIC BLOOD PRESSURE: 114 MMHG | OXYGEN SATURATION: 97 % | HEART RATE: 76 BPM

## 2022-09-21 DIAGNOSIS — C53.8 MALIGNANT NEOPLASM OF OVERLAPPING SITES OF CERVIX (HCC): Primary | ICD-10-CM

## 2022-09-21 DIAGNOSIS — C53.8 MALIGNANT NEOPLASM OF OVERLAPPING SITES OF CERVIX (HCC): ICD-10-CM

## 2022-09-21 DIAGNOSIS — N18.9 CHRONIC KIDNEY DISEASE, UNSPECIFIED CKD STAGE: Primary | ICD-10-CM

## 2022-09-21 PROCEDURE — 77014 PR CT GUIDANCE PLACEMENT RAD THERAPY FIELDS: CPT | Performed by: RADIOLOGY

## 2022-09-21 PROCEDURE — 2580000003 HC RX 258: Performed by: INTERNAL MEDICINE

## 2022-09-21 PROCEDURE — 1123F ACP DISCUSS/DSCN MKR DOCD: CPT | Performed by: INTERNAL MEDICINE

## 2022-09-21 PROCEDURE — 2500000003 HC RX 250 WO HCPCS: Performed by: INTERNAL MEDICINE

## 2022-09-21 PROCEDURE — 96361 HYDRATE IV INFUSION ADD-ON: CPT

## 2022-09-21 PROCEDURE — 96375 TX/PRO/DX INJ NEW DRUG ADDON: CPT

## 2022-09-21 PROCEDURE — 77386 HC NTSTY MODUL RAD TX DLVR CPLX: CPT | Performed by: RADIOLOGY

## 2022-09-21 PROCEDURE — 99214 OFFICE O/P EST MOD 30 MIN: CPT | Performed by: INTERNAL MEDICINE

## 2022-09-21 PROCEDURE — 6360000002 HC RX W HCPCS: Performed by: INTERNAL MEDICINE

## 2022-09-21 PROCEDURE — 96413 CHEMO IV INFUSION 1 HR: CPT

## 2022-09-21 RX ORDER — SODIUM CHLORIDE 9 MG/ML
5-250 INJECTION, SOLUTION INTRAVENOUS PRN
Status: CANCELLED | OUTPATIENT
Start: 2022-09-21

## 2022-09-21 RX ORDER — SODIUM CHLORIDE 0.9 % (FLUSH) 0.9 %
5-40 SYRINGE (ML) INJECTION PRN
Status: CANCELLED | OUTPATIENT
Start: 2022-09-21

## 2022-09-21 RX ORDER — DIPHENHYDRAMINE HYDROCHLORIDE 50 MG/ML
50 INJECTION INTRAMUSCULAR; INTRAVENOUS
Status: CANCELLED | OUTPATIENT
Start: 2022-09-21

## 2022-09-21 RX ORDER — SODIUM CHLORIDE 9 MG/ML
5-40 INJECTION INTRAVENOUS PRN
Status: CANCELLED | OUTPATIENT
Start: 2022-09-21

## 2022-09-21 RX ORDER — ONDANSETRON 2 MG/ML
8 INJECTION INTRAMUSCULAR; INTRAVENOUS ONCE
Status: CANCELLED
Start: 2022-09-21 | End: 2022-09-21

## 2022-09-21 RX ORDER — ALBUTEROL SULFATE 90 UG/1
4 AEROSOL, METERED RESPIRATORY (INHALATION) PRN
Status: CANCELLED | OUTPATIENT
Start: 2022-09-21

## 2022-09-21 RX ORDER — EPINEPHRINE 1 MG/ML
0.3 INJECTION, SOLUTION, CONCENTRATE INTRAVENOUS PRN
Status: CANCELLED | OUTPATIENT
Start: 2022-09-21

## 2022-09-21 RX ORDER — 0.9 % SODIUM CHLORIDE 0.9 %
1000 INTRAVENOUS SOLUTION INTRAVENOUS ONCE
Status: CANCELLED | OUTPATIENT
Start: 2022-09-21 | End: 2022-09-21

## 2022-09-21 RX ORDER — HEPARIN SODIUM (PORCINE) LOCK FLUSH IV SOLN 100 UNIT/ML 100 UNIT/ML
500 SOLUTION INTRAVENOUS PRN
Status: CANCELLED | OUTPATIENT
Start: 2022-09-21

## 2022-09-21 RX ORDER — DIPHENHYDRAMINE HYDROCHLORIDE 50 MG/ML
25 INJECTION INTRAMUSCULAR; INTRAVENOUS ONCE
Status: COMPLETED | OUTPATIENT
Start: 2022-09-21 | End: 2022-09-21

## 2022-09-21 RX ORDER — FAMOTIDINE 10 MG/ML
20 INJECTION, SOLUTION INTRAVENOUS ONCE
Status: CANCELLED | OUTPATIENT
Start: 2022-09-21 | End: 2022-09-21

## 2022-09-21 RX ORDER — FAMOTIDINE 10 MG/ML
20 INJECTION, SOLUTION INTRAVENOUS
Status: CANCELLED | OUTPATIENT
Start: 2022-09-21

## 2022-09-21 RX ORDER — ACETAMINOPHEN 325 MG/1
650 TABLET ORAL
Status: CANCELLED | OUTPATIENT
Start: 2022-09-21

## 2022-09-21 RX ORDER — DIPHENHYDRAMINE HYDROCHLORIDE 50 MG/ML
25 INJECTION INTRAMUSCULAR; INTRAVENOUS ONCE
Status: CANCELLED | OUTPATIENT
Start: 2022-09-21 | End: 2022-09-21

## 2022-09-21 RX ORDER — DEXAMETHASONE SODIUM PHOSPHATE 10 MG/ML
10 INJECTION INTRAMUSCULAR; INTRAVENOUS ONCE
Status: COMPLETED | OUTPATIENT
Start: 2022-09-21 | End: 2022-09-21

## 2022-09-21 RX ORDER — ONDANSETRON 2 MG/ML
8 INJECTION INTRAMUSCULAR; INTRAVENOUS ONCE
Status: COMPLETED | OUTPATIENT
Start: 2022-09-21 | End: 2022-09-21

## 2022-09-21 RX ORDER — SODIUM CHLORIDE 9 MG/ML
INJECTION, SOLUTION INTRAVENOUS CONTINUOUS
Status: CANCELLED | OUTPATIENT
Start: 2022-09-21

## 2022-09-21 RX ORDER — ONDANSETRON 2 MG/ML
8 INJECTION INTRAMUSCULAR; INTRAVENOUS
Status: CANCELLED | OUTPATIENT
Start: 2022-09-21

## 2022-09-21 RX ORDER — 0.9 % SODIUM CHLORIDE 0.9 %
1000 INTRAVENOUS SOLUTION INTRAVENOUS ONCE
Status: COMPLETED | OUTPATIENT
Start: 2022-09-21 | End: 2022-09-21

## 2022-09-21 RX ORDER — MEPERIDINE HYDROCHLORIDE 50 MG/ML
12.5 INJECTION INTRAMUSCULAR; INTRAVENOUS; SUBCUTANEOUS PRN
Status: CANCELLED | OUTPATIENT
Start: 2022-09-21

## 2022-09-21 RX ORDER — SODIUM CHLORIDE 9 MG/ML
5-40 INJECTION INTRAVENOUS PRN
Status: DISCONTINUED | OUTPATIENT
Start: 2022-09-21 | End: 2022-09-22 | Stop reason: HOSPADM

## 2022-09-21 RX ADMIN — DIPHENHYDRAMINE HYDROCHLORIDE 25 MG: 50 INJECTION INTRAMUSCULAR; INTRAVENOUS at 12:16

## 2022-09-21 RX ADMIN — SODIUM CHLORIDE 1000 ML: 9 INJECTION, SOLUTION INTRAVENOUS at 12:08

## 2022-09-21 RX ADMIN — DEXAMETHASONE SODIUM PHOSPHATE 10 MG: 10 INJECTION INTRAMUSCULAR; INTRAVENOUS at 12:12

## 2022-09-21 RX ADMIN — SODIUM CHLORIDE 10 ML: 9 INJECTION, SOLUTION INTRAMUSCULAR; INTRAVENOUS; SUBCUTANEOUS at 12:16

## 2022-09-21 RX ADMIN — SODIUM CHLORIDE 10 ML: 9 INJECTION, SOLUTION INTRAMUSCULAR; INTRAVENOUS; SUBCUTANEOUS at 12:23

## 2022-09-21 RX ADMIN — SODIUM CHLORIDE 20 MG: 9 INJECTION, SOLUTION INTRAMUSCULAR; INTRAVENOUS; SUBCUTANEOUS at 12:08

## 2022-09-21 RX ADMIN — ONDANSETRON 8 MG: 2 INJECTION INTRAMUSCULAR; INTRAVENOUS at 12:23

## 2022-09-21 RX ADMIN — SODIUM CHLORIDE 10 ML: 9 INJECTION, SOLUTION INTRAMUSCULAR; INTRAVENOUS; SUBCUTANEOUS at 12:12

## 2022-09-21 RX ADMIN — PACLITAXEL 102 MG: 6 INJECTION, SOLUTION, CONCENTRATE INTRAVENOUS at 12:51

## 2022-09-21 NOTE — TELEPHONE ENCOUNTER
Met with patient during chemotherapy treatment for follow up. Patient appears well and is in good spirits. States that she is doing well with the treatments. Reports diarrhea but has good response to medication. Reports eating and sleeping well. Provided support and encouragement. Denies any current needs for assistance from NN. Patient appreciative of visit. Will continue to follow as needed.  Rick BOWLES, RN, Oncology Nurse Navigator

## 2022-09-21 NOTE — TELEPHONE ENCOUNTER
Dr Meredith Ordonez requests that pt be seen soon with nephrologist d/t low Na and increasing BUN and Creatine-Pt requests that Dr Briana Mora at [facility] be referred-Referral information faxed to Dr Pat Monzon office on 9/20/22-This nurse called and spoke with Sonya Littlejohn at Dr Pat Monzon  office to see if referral could be processed sooner rather than later-Keyona states that the Dr would review the pts information and then decide when to schedule pt-This nurse encouraged Sonya Littlejohn to call this nurse back soon so another referral can be made if Dr Mcdaniels Para see the pt soon-Keyona voiced understanding-The pts last three progress notes and last three lab draws were faxed to Dr Pat Monzon office with fax confirmation received-Pt notified of above during [id number] [facility]

## 2022-09-21 NOTE — PROGRESS NOTES
DEPARTMENT OF RADIATION ONCOLOGY   ON TREATMENT VISIT       9/21/2022      NAME:  Loyce Spatz    YOB: 1955    DIAGNOSIS: pathological stage of pT1B2, pN0, M0, moderately differentiated squamous cell carcinoma of the cervix, SP open radical hysterectomy with lymph ryley sampling. 2.5 cm in maximum dimension, intermediate risk, with 2 positive Sedlis factors (more than two third cervical stromal invasion, focal lymphovascular invasion)     SUBJECTIVE:   Loyce Spatz has now received 1980 cGy in 11 fractions directed to the pelvis. Past medical, surgical, social and family histories reviewed and updated as indicated. PAIN: No    ALLERGIES:  Patient has no known allergies. Current Outpatient Medications   Medication Sig Dispense Refill    prochlorperazine (COMPAZINE) 10 MG tablet Take 1 tablet by mouth every 6 hours as needed (nausea) 60 tablet 1    ondansetron (ZOFRAN-ODT) 4 MG disintegrating tablet Place 2 tablets under the tongue every 8 hours as needed for Nausea or Vomiting 21 tablet 1    acetaminophen (TYLENOL) 500 MG tablet Take 2 tablets by mouth every 6 hours as needed for Pain 60 tablet 0    lisinopril-hydroCHLOROthiazide (PRINZIDE;ZESTORETIC) 20-12.5 MG per tablet Take 1 tablet by mouth daily      OMEPRAZOLE PO Take 20 mg by mouth daily      Levothyroxine Sodium (SYNTHROID PO) Take by mouth daily      NONFORMULARY at bedtime Sleeping Pill      lisinopril-hydroCHLOROthiazide (PRINZIDE;ZESTORETIC) 20-25 MG per tablet TAKE 1 TABLET BY MOUTH IN THE MORNING      omeprazole (PRILOSEC) 20 MG delayed release capsule TAKE 1 CAPSULE BY MOUTH IN THE MORNING BEFORE BREAKFAST      traZODone (DESYREL) 50 MG tablet       ALPRAZolam (XANAX) 0.25 MG tablet       levothyroxine (SYNTHROID) 50 MCG tablet Take 50 mcg by mouth Daily       No current facility-administered medications for this encounter.      Facility-Administered Medications Ordered in Other Encounters   Medication Dose Route

## 2022-09-21 NOTE — PROGRESS NOTES
[phone] Pending [facility] to [facility] MEDICAL ONCOLOGY  [address] [place], [place] [address] [address]  Dept: [phone] Dex: [phone]  Attending progress note       Reason for Visit:   Cervical cancer. Referring Physician:  Robina Fernandez MD     PCP:  JULIO Malik     History of Present Illness:      Mrs. Emmie Read is a very pleasant 23-year-old lady, with a past medical history significant for hypertension, GERD, hypothyroidism, and skin cancer, who had presented with an abnormal Pap smear, she had a cervical biopsy done on 6/3/2022, revealing invasive nonkeratinizing squamous cell carcinoma, p16 positive, the patient was referred to Dr. Lee Goodpasture, she underwent on 7/19/2022 radical hysterectomy with bilateral salpingo-oophorectomy and bilateral pelvic lymphadenectomy, pathology was consistent with moderately differentiated squamous cell carcinoma, invading at least into the middle third, and close a portion of the outer third of the cervical stroma, maximum dimension of the tumor 2.5 cm, with depth of stromal invasion of 9 mm, lymphovascular invasion was present, focal, all margins were negative, the closest margin was the ectocervical, radial/circumferential and the distance from invasive carcinoma to closest margin is was 4 mm. High-grade squamous intraepithelial lesion was present at the margin. 2 premature lymph nodes were negative for metastasis, 5 pelvic lymph nodes were negative for metastasis. Final pathologic stage IB2. The patient had recovered well from the surgery. When she had blood work done on 6/23/2020, she was found to have a creatinine of 2, repeat creatinine from 7/20/2022 was 1.54. The patient was not aware of any kidney issues prior to that. On 9/7/2022, the patient was started on adjuvant chemo RT using weekly Taxol. She had some nausea, took the oral antiemetics with good effect. On 09/21/22: For regular follow up.  She had her recent chemo on 09/14/22. She has been having diarrhea up to 3 times daily. She also complaints about weakness on both lower extremities, wondering if it's could be the side effect of Chemo. On review of her system, she has no significant concern. Her recent labs are significant for hyponatremia 125, and Cr. 1.6 which has been persistently elevated. Regarding that concern planning to refer to a nephrologist. She is taking her daily medications regularly. Review of Systems;  CONSTITUTIONAL: No fever, chills. Good appetite and energy level. ENMT: Eyes: No diplopia; Nose: No epistaxis. Mouth: No sore throat. RESPIRATORY: No hemoptysis, shortness of breath, cough. CARDIOVASCULAR: No chest pain, palpitations. GASTROINTESTINAL: Positive for nausea, no vomiting, no abdominal pain, positive for diarrhea. GENITOURINARY: No dysuria, urinary frequency, hematuria. NEURO: No syncope, presyncope, headache. Remainder:  ROS NEGATIVE     Past Medical History:  Past Medical History             Diagnosis Date    Abnormal Pap smear of cervix      Acid reflux      Arthritis      Hemorrhage 1982     s/p vaginal delivery     History of blood transfusion      Hypertension      Skin cancer      Thyroid disease               Patient Active Problem List   Diagnosis    Malignant neoplasm of overlapping sites of cervix [facility])         Past Surgical History:  Past Surgical History             Procedure Laterality Date    SKIN CANCER EXCISION        THYROID SURGERY         part of thyroid removal    THYROIDECTOMY, PARTIAL   9807-3504            Family History:  Family History         Family History   Problem Relation Age of Onset    Cancer Mother           Lung    Lung Cancer Mother              Medications:  Reviewed and reconciled.      Social History:  Social History               Socioeconomic History    Marital status:        Spouse name: Not on file    Number of children: 1    Years of education: Not on file    Highest education level: Not on file   Occupational History    Not on file   Tobacco Use    Smoking status: Never    Smokeless tobacco: Never   Vaping Use    Vaping Use: Never used   Substance and Sexual Activity    Alcohol use: Never    Drug use: Never    Sexual activity: Not on file   Other Topics Concern    Not on file   Social History Narrative     ** Merged History Encounter **            Social Determinants of Health      Financial Resource Strain: Not on file   Food Insecurity: Not on file   Transportation Needs: Not on file   Physical Activity: Not on file   Stress: Not on file   Social Connections: Not on file   Intimate Partner Violence: Not on file   Housing Stability: Not on file            Allergies:  No Known Allergies     Physical Exam:  BP (!) 140/66   Pulse 100   Temp 97.6 °F (36.4 °C)   Resp (!) 99   Ht 5' 6\" (1.676 m)   Wt 206 lb 9.6 oz (93.7 kg)   BMI 33.35 kg/m²   GENERAL: Alert, oriented x 3, not in acute distress. HEENT: PERRLA; EOMI. Oropharynx clear. NECK: Supple. No palpable cervical or supraclavicular lymphadenopathy. LUNGS: Good air entry bilaterally. No wheezing, crackles or rhonchi. CARDIOVASCULAR: Regular rate. No murmurs, rubs or gallops. ABDOMEN: Incisions are healing nicely. Soft. Non-tender, non-distended. Positive bowel sounds. EXTREMITIES: Without clubbing, cyanosis, or edema. NEUROLOGIC: No focal deficits.    ECOG PS 1        Impression/Plan:       Mrs. Angela Melgar is a very pleasant 36-year-old lady, with a past medical history significant for hypertension, GERD, hypothyroidism, and skin cancer, who had presented with an abnormal Pap smear, she had a cervical biopsy done on 6/3/2022, revealing invasive nonkeratinizing squamous cell carcinoma, p16 positive, the patient was referred to Dr. Kei Kinsey, she underwent on 7/19/2022 radical hysterectomy with bilateral salpingo-oophorectomy and bilateral pelvic lymphadenectomy, pathology was consistent with moderately differentiated squamous cell carcinoma, invading at least into the middle third, and close a portion of the outer third of the cervical stroma, maximum dimension of the tumor 2.5 cm, with depth of stromal invasion of 9 mm, lymphovascular invasion was present, focal, all margins were negative, the closest margin was the ectocervical, radial/circumferential and the distance from invasive carcinoma to closest margin is was 4 mm. High-grade squamous intraepithelial lesion was present at the margin. 2 premature lymph nodes were negative for metastasis, 5 pelvic lymph nodes were negative for metastasis. Final pathologic stage IB2. The patient has a newly diagnosed cervical squamous cell carcinoma, p16 positive, she has intermediate risk disease, diagnosis and prognosis were discussed with the patient. Due to the presence of lymphovascular invasion, middle one third stromal invasion and tumor size greater than 2 cm, adjuvant chemo RT is recommended, due to the CKD, did not recommend cisplatin, we decided to treat with weekly Taxol for radiosensitization. Side effects were reviewed with the patient. On 9/7/2022, the patient was started on adjuvant chemo RT using weekly Taxol. She has received 2 cycles to date, she has diarrhea, controlled with Imodium, we discussed Lomotil if needed. Labs reviewed, creatinine is stable at 1.6, but she has hyponatremia with sodium of 125. We will hold Prinzide, she will receive intravenous hydration, will start adding salt to her food, the patient was referred to nephrology last week, will follow up on the referral and request to have her seen as soon as possible. We will have a BMP checked on Friday with possible hydration. Proceed with Taxol, cycle #3. RTC in 1 week. Thank you for allowing us to participate in the care of Mrs. Ignacia Joy.         Marli Garcia MD  PGY-1  [id number] [facility] [address], MD   HEMATOLOGY/MEDICAL 1 [address] [facility]  [address] [facility] [address]  Dept: [phone] Dex: [phone]

## 2022-09-22 ENCOUNTER — HOSPITAL ENCOUNTER (OUTPATIENT)
Dept: RADIATION ONCOLOGY | Age: 67
Discharge: HOME | End: 2022-09-22
Payer: COMMERCIAL

## 2022-09-22 PROCEDURE — 77014 PR CT GUIDANCE PLACEMENT RAD THERAPY FIELDS: CPT | Performed by: RADIOLOGY

## 2022-09-22 PROCEDURE — 77386 HC NTSTY MODUL RAD TX DLVR CPLX: CPT | Performed by: RADIOLOGY

## 2022-09-22 NOTE — PROGRESS NOTES
Patient here for her treatment today, reports she has not had any episodes of diarrhea today; she has not taken any immodium today.   There was \"gas bubble\" noted in rectum during her treatment, per Dr Vickey Ansari instructed patient to start Gas-X (pt states she has at home) will talk with [facility] tomorrow about how to take immodium if she is not having diarrhea

## 2022-09-23 ENCOUNTER — HOSPITAL ENCOUNTER (OUTPATIENT)
Dept: RADIATION ONCOLOGY | Age: 67
Discharge: HOME | End: 2022-09-23
Payer: COMMERCIAL

## 2022-09-23 ENCOUNTER — TELEPHONE (OUTPATIENT)
Dept: INFUSION THERAPY | Age: 67
End: 2022-09-23

## 2022-09-23 ENCOUNTER — HOSPITAL ENCOUNTER (OUTPATIENT)
Dept: INFUSION THERAPY | Age: 67
Discharge: HOME | End: 2022-09-23
Payer: COMMERCIAL

## 2022-09-23 DIAGNOSIS — C53.8 MALIGNANT NEOPLASM OF OVERLAPPING SITES OF CERVIX (HCC): ICD-10-CM

## 2022-09-23 LAB
ANION GAP SERPL CALCULATED.3IONS-SCNC: 12 MMOL/L (ref 7–16)
BUN BLDV-MCNC: 29 MG/DL (ref 6–23)
CALCIUM SERPL-MCNC: 9.3 MG/DL (ref 8.6–10.2)
CHLORIDE BLD-SCNC: 105 MMOL/L (ref 98–107)
CO2: 21 MMOL/L (ref 22–29)
CREAT SERPL-MCNC: 1.4 MG/DL (ref 0.5–1)
GFR AFRICAN AMERICAN: 46
GFR NON-AFRICAN AMERICAN: 38 ML/MIN/1.73
GLUCOSE BLD-MCNC: 97 MG/DL (ref 74–99)
POTASSIUM SERPL-SCNC: 4.3 MMOL/L (ref 3.5–5)
SODIUM BLD-SCNC: 138 MMOL/L (ref 132–146)

## 2022-09-23 PROCEDURE — 77014 PR CT GUIDANCE PLACEMENT RAD THERAPY FIELDS: CPT | Performed by: RADIOLOGY

## 2022-09-23 PROCEDURE — 36415 COLL VENOUS BLD VENIPUNCTURE: CPT

## 2022-09-23 PROCEDURE — 77386 HC NTSTY MODUL RAD TX DLVR CPLX: CPT | Performed by: RADIOLOGY

## 2022-09-23 PROCEDURE — 80048 BASIC METABOLIC PNL TOTAL CA: CPT

## 2022-09-23 NOTE — TELEPHONE ENCOUNTER
Dr Mekhi David office called on Wednesday and stated that pt was able to see Dr Evert Villatoro on Friday-This nurse encouraged office to call pt to confirm date and time-SANJAY Gan RN

## 2022-09-26 ENCOUNTER — APPOINTMENT (OUTPATIENT)
Dept: RADIATION ONCOLOGY | Age: 67
End: 2022-09-26
Payer: COMMERCIAL

## 2022-09-27 ENCOUNTER — HOSPITAL ENCOUNTER (OUTPATIENT)
Dept: RADIATION ONCOLOGY | Age: 67
Discharge: HOME | End: 2022-09-27
Payer: COMMERCIAL

## 2022-09-27 ENCOUNTER — TELEPHONE (OUTPATIENT)
Dept: INFUSION THERAPY | Age: 67
End: 2022-09-27

## 2022-09-27 ENCOUNTER — HOSPITAL ENCOUNTER (OUTPATIENT)
Dept: INFUSION THERAPY | Age: 67
Discharge: HOME | End: 2022-09-27
Payer: COMMERCIAL

## 2022-09-27 DIAGNOSIS — C53.8 MALIGNANT NEOPLASM OF OVERLAPPING SITES OF CERVIX (HCC): ICD-10-CM

## 2022-09-27 LAB
ALBUMIN SERPL-MCNC: 4.1 G/DL (ref 3.5–5.2)
ALP BLD-CCNC: 61 U/L (ref 35–104)
ALT SERPL-CCNC: 17 U/L (ref 0–32)
ANION GAP SERPL CALCULATED.3IONS-SCNC: 12 MMOL/L (ref 7–16)
AST SERPL-CCNC: 16 U/L (ref 0–31)
BASOPHILS ABSOLUTE: 0.02 E9/L (ref 0–0.2)
BASOPHILS RELATIVE PERCENT: 0.4 % (ref 0–2)
BILIRUB SERPL-MCNC: 0.3 MG/DL (ref 0–1.2)
BUN BLDV-MCNC: 22 MG/DL (ref 6–23)
CALCIUM SERPL-MCNC: 9.4 MG/DL (ref 8.6–10.2)
CHLORIDE BLD-SCNC: 99 MMOL/L (ref 98–107)
CO2: 22 MMOL/L (ref 22–29)
CREAT SERPL-MCNC: 1.6 MG/DL (ref 0.5–1)
EOSINOPHILS ABSOLUTE: 0.34 E9/L (ref 0.05–0.5)
EOSINOPHILS RELATIVE PERCENT: 6.3 % (ref 0–6)
GFR AFRICAN AMERICAN: 39
GFR NON-AFRICAN AMERICAN: 32 ML/MIN/1.73
GLUCOSE BLD-MCNC: 117 MG/DL (ref 74–99)
HCT VFR BLD CALC: 32.9 % (ref 34–48)
HEMOGLOBIN: 11.3 G/DL (ref 11.5–15.5)
IMMATURE GRANULOCYTES #: 0.05 E9/L
IMMATURE GRANULOCYTES %: 0.9 % (ref 0–5)
LYMPHOCYTES ABSOLUTE: 0.59 E9/L (ref 1.5–4)
LYMPHOCYTES RELATIVE PERCENT: 10.9 % (ref 20–42)
MCH RBC QN AUTO: 31 PG (ref 26–35)
MCHC RBC AUTO-ENTMCNC: 34.3 % (ref 32–34.5)
MCV RBC AUTO: 90.1 FL (ref 80–99.9)
MONOCYTES ABSOLUTE: 0.32 E9/L (ref 0.1–0.95)
MONOCYTES RELATIVE PERCENT: 5.9 % (ref 2–12)
NEUTROPHILS ABSOLUTE: 4.1 E9/L (ref 1.8–7.3)
NEUTROPHILS RELATIVE PERCENT: 75.6 % (ref 43–80)
PDW BLD-RTO: 13.7 FL (ref 11.5–15)
PLATELET # BLD: 226 E9/L (ref 130–450)
PMV BLD AUTO: 10.3 FL (ref 7–12)
POTASSIUM SERPL-SCNC: 4.1 MMOL/L (ref 3.5–5)
RBC # BLD: 3.65 E12/L (ref 3.5–5.5)
RBC # BLD: NORMAL 10*6/UL
SODIUM BLD-SCNC: 133 MMOL/L (ref 132–146)
TOTAL PROTEIN: 6.7 G/DL (ref 6.4–8.3)
WBC # BLD: 5.4 E9/L (ref 4.5–11.5)

## 2022-09-27 PROCEDURE — 85025 COMPLETE CBC W/AUTO DIFF WBC: CPT

## 2022-09-27 PROCEDURE — 77014 PR CT GUIDANCE PLACEMENT RAD THERAPY FIELDS: CPT | Performed by: RADIOLOGY

## 2022-09-27 PROCEDURE — 77386 HC NTSTY MODUL RAD TX DLVR CPLX: CPT | Performed by: RADIOLOGY

## 2022-09-27 PROCEDURE — 77427 RADIATION TX MANAGEMENT X5: CPT | Performed by: RADIOLOGY

## 2022-09-27 PROCEDURE — 36415 COLL VENOUS BLD VENIPUNCTURE: CPT

## 2022-09-27 PROCEDURE — 80053 COMPREHEN METABOLIC PANEL: CPT

## 2022-09-28 ENCOUNTER — HOSPITAL ENCOUNTER (OUTPATIENT)
Dept: RADIATION ONCOLOGY | Age: 67
Discharge: HOME | End: 2022-09-28
Payer: COMMERCIAL

## 2022-09-28 ENCOUNTER — OFFICE VISIT (OUTPATIENT)
Dept: ONCOLOGY | Age: 67
End: 2022-09-28
Payer: COMMERCIAL

## 2022-09-28 ENCOUNTER — HOSPITAL ENCOUNTER (OUTPATIENT)
Dept: INFUSION THERAPY | Age: 67
Discharge: HOME | End: 2022-09-28
Payer: COMMERCIAL

## 2022-09-28 VITALS
BODY MASS INDEX: 33.73 KG/M2 | WEIGHT: 209 LBS | RESPIRATION RATE: 20 BRPM | DIASTOLIC BLOOD PRESSURE: 67 MMHG | SYSTOLIC BLOOD PRESSURE: 125 MMHG | TEMPERATURE: 97.7 F | HEART RATE: 97 BPM

## 2022-09-28 VITALS
HEIGHT: 66 IN | WEIGHT: 208.4 LBS | OXYGEN SATURATION: 99 % | BODY MASS INDEX: 33.49 KG/M2 | SYSTOLIC BLOOD PRESSURE: 124 MMHG | DIASTOLIC BLOOD PRESSURE: 64 MMHG | HEART RATE: 102 BPM | TEMPERATURE: 98.4 F

## 2022-09-28 VITALS — HEART RATE: 79 BPM | RESPIRATION RATE: 16 BRPM | SYSTOLIC BLOOD PRESSURE: 101 MMHG | DIASTOLIC BLOOD PRESSURE: 54 MMHG

## 2022-09-28 DIAGNOSIS — C53.8 MALIGNANT NEOPLASM OF OVERLAPPING SITES OF CERVIX (HCC): Primary | ICD-10-CM

## 2022-09-28 PROCEDURE — 77386 HC NTSTY MODUL RAD TX DLVR CPLX: CPT | Performed by: RADIOLOGY

## 2022-09-28 PROCEDURE — 96361 HYDRATE IV INFUSION ADD-ON: CPT

## 2022-09-28 PROCEDURE — 1123F ACP DISCUSS/DSCN MKR DOCD: CPT | Performed by: INTERNAL MEDICINE

## 2022-09-28 PROCEDURE — 96375 TX/PRO/DX INJ NEW DRUG ADDON: CPT

## 2022-09-28 PROCEDURE — 77014 PR CT GUIDANCE PLACEMENT RAD THERAPY FIELDS: CPT | Performed by: RADIOLOGY

## 2022-09-28 PROCEDURE — 2500000003 HC RX 250 WO HCPCS: Performed by: INTERNAL MEDICINE

## 2022-09-28 PROCEDURE — 96413 CHEMO IV INFUSION 1 HR: CPT

## 2022-09-28 PROCEDURE — 77336 RADIATION PHYSICS CONSULT: CPT | Performed by: RADIOLOGY

## 2022-09-28 PROCEDURE — 6360000002 HC RX W HCPCS: Performed by: INTERNAL MEDICINE

## 2022-09-28 PROCEDURE — 2580000003 HC RX 258: Performed by: INTERNAL MEDICINE

## 2022-09-28 PROCEDURE — 99214 OFFICE O/P EST MOD 30 MIN: CPT | Performed by: INTERNAL MEDICINE

## 2022-09-28 RX ORDER — SODIUM CHLORIDE 9 MG/ML
5-250 INJECTION, SOLUTION INTRAVENOUS PRN
Status: CANCELLED | OUTPATIENT
Start: 2022-09-28

## 2022-09-28 RX ORDER — DIPHENHYDRAMINE HYDROCHLORIDE 50 MG/ML
25 INJECTION INTRAMUSCULAR; INTRAVENOUS ONCE
Status: CANCELLED | OUTPATIENT
Start: 2022-09-28 | End: 2022-09-28

## 2022-09-28 RX ORDER — SODIUM CHLORIDE 9 MG/ML
5-40 INJECTION INTRAVENOUS PRN
Status: CANCELLED | OUTPATIENT
Start: 2022-09-28

## 2022-09-28 RX ORDER — 0.9 % SODIUM CHLORIDE 0.9 %
1000 INTRAVENOUS SOLUTION INTRAVENOUS ONCE
Status: COMPLETED | OUTPATIENT
Start: 2022-09-28 | End: 2022-09-28

## 2022-09-28 RX ORDER — ONDANSETRON 2 MG/ML
8 INJECTION INTRAMUSCULAR; INTRAVENOUS
Status: CANCELLED | OUTPATIENT
Start: 2022-09-28

## 2022-09-28 RX ORDER — ALBUTEROL SULFATE 90 UG/1
4 AEROSOL, METERED RESPIRATORY (INHALATION) PRN
Status: CANCELLED | OUTPATIENT
Start: 2022-09-28

## 2022-09-28 RX ORDER — DEXAMETHASONE SODIUM PHOSPHATE 10 MG/ML
10 INJECTION INTRAMUSCULAR; INTRAVENOUS ONCE
Status: COMPLETED | OUTPATIENT
Start: 2022-09-28 | End: 2022-09-28

## 2022-09-28 RX ORDER — ONDANSETRON 2 MG/ML
8 INJECTION INTRAMUSCULAR; INTRAVENOUS ONCE
Status: CANCELLED
Start: 2022-09-28 | End: 2022-09-28

## 2022-09-28 RX ORDER — SODIUM CHLORIDE 9 MG/ML
5-250 INJECTION, SOLUTION INTRAVENOUS PRN
Status: DISCONTINUED | OUTPATIENT
Start: 2022-09-28 | End: 2022-09-29 | Stop reason: HOSPADM

## 2022-09-28 RX ORDER — EPINEPHRINE 1 MG/ML
0.3 INJECTION, SOLUTION, CONCENTRATE INTRAVENOUS PRN
Status: CANCELLED | OUTPATIENT
Start: 2022-09-28

## 2022-09-28 RX ORDER — ONDANSETRON 2 MG/ML
8 INJECTION INTRAMUSCULAR; INTRAVENOUS ONCE
Status: COMPLETED | OUTPATIENT
Start: 2022-09-28 | End: 2022-09-28

## 2022-09-28 RX ORDER — FAMOTIDINE 10 MG/ML
20 INJECTION, SOLUTION INTRAVENOUS
Status: CANCELLED | OUTPATIENT
Start: 2022-09-28

## 2022-09-28 RX ORDER — HEPARIN SODIUM (PORCINE) LOCK FLUSH IV SOLN 100 UNIT/ML 100 UNIT/ML
500 SOLUTION INTRAVENOUS PRN
Status: CANCELLED | OUTPATIENT
Start: 2022-09-28

## 2022-09-28 RX ORDER — ACETAMINOPHEN 325 MG/1
650 TABLET ORAL
Status: CANCELLED | OUTPATIENT
Start: 2022-09-28

## 2022-09-28 RX ORDER — MEPERIDINE HYDROCHLORIDE 50 MG/ML
12.5 INJECTION INTRAMUSCULAR; INTRAVENOUS; SUBCUTANEOUS PRN
Status: CANCELLED | OUTPATIENT
Start: 2022-09-28

## 2022-09-28 RX ORDER — SODIUM CHLORIDE 9 MG/ML
5-40 INJECTION INTRAVENOUS PRN
Status: DISCONTINUED | OUTPATIENT
Start: 2022-09-28 | End: 2022-09-29 | Stop reason: HOSPADM

## 2022-09-28 RX ORDER — DIPHENHYDRAMINE HYDROCHLORIDE 50 MG/ML
25 INJECTION INTRAMUSCULAR; INTRAVENOUS ONCE
Status: COMPLETED | OUTPATIENT
Start: 2022-09-28 | End: 2022-09-28

## 2022-09-28 RX ORDER — DIPHENHYDRAMINE HYDROCHLORIDE 50 MG/ML
50 INJECTION INTRAMUSCULAR; INTRAVENOUS
Status: CANCELLED | OUTPATIENT
Start: 2022-09-28

## 2022-09-28 RX ORDER — FAMOTIDINE 10 MG/ML
20 INJECTION, SOLUTION INTRAVENOUS ONCE
Status: CANCELLED | OUTPATIENT
Start: 2022-09-28 | End: 2022-09-28

## 2022-09-28 RX ORDER — SODIUM CHLORIDE 0.9 % (FLUSH) 0.9 %
5-40 SYRINGE (ML) INJECTION PRN
Status: CANCELLED | OUTPATIENT
Start: 2022-09-28

## 2022-09-28 RX ORDER — SODIUM CHLORIDE 9 MG/ML
INJECTION, SOLUTION INTRAVENOUS CONTINUOUS
Status: CANCELLED | OUTPATIENT
Start: 2022-09-28

## 2022-09-28 RX ADMIN — ONDANSETRON 8 MG: 2 INJECTION INTRAMUSCULAR; INTRAVENOUS at 12:52

## 2022-09-28 RX ADMIN — PACLITAXEL 102 MG: 6 INJECTION, SOLUTION, CONCENTRATE INTRAVENOUS at 13:34

## 2022-09-28 RX ADMIN — SODIUM CHLORIDE 10 ML: 9 INJECTION, SOLUTION INTRAMUSCULAR; INTRAVENOUS; SUBCUTANEOUS at 12:53

## 2022-09-28 RX ADMIN — SODIUM CHLORIDE 10 ML: 9 INJECTION, SOLUTION INTRAMUSCULAR; INTRAVENOUS; SUBCUTANEOUS at 13:09

## 2022-09-28 RX ADMIN — DIPHENHYDRAMINE HYDROCHLORIDE 25 MG: 50 INJECTION, SOLUTION INTRAMUSCULAR; INTRAVENOUS at 13:09

## 2022-09-28 RX ADMIN — SODIUM CHLORIDE 1000 ML: 9 INJECTION, SOLUTION INTRAVENOUS at 12:38

## 2022-09-28 RX ADMIN — SODIUM CHLORIDE 20 MG: 9 INJECTION, SOLUTION INTRAMUSCULAR; INTRAVENOUS; SUBCUTANEOUS at 12:58

## 2022-09-28 RX ADMIN — SODIUM CHLORIDE 10 ML: 9 INJECTION, SOLUTION INTRAMUSCULAR; INTRAVENOUS; SUBCUTANEOUS at 13:03

## 2022-09-28 RX ADMIN — DEXAMETHASONE SODIUM PHOSPHATE 10 MG: 10 INJECTION INTRAMUSCULAR; INTRAVENOUS at 13:03

## 2022-09-28 RX ADMIN — SODIUM CHLORIDE 10 ML: 9 INJECTION, SOLUTION INTRAMUSCULAR; INTRAVENOUS; SUBCUTANEOUS at 12:38

## 2022-09-28 NOTE — PROGRESS NOTES
[address] [facility] MEDICAL ONCOLOGY  [address] [place], [place] [address] [address]  Dept: [phone] Dex: [phone]  Attending progress note       Reason for Visit:   Cervical cancer. Referring Physician:  Husam Ambrosio MD     PCP:  JULIO Dickerson     History of Present Illness:      Mrs. Sharol Lombard is a very pleasant 45-year-old lady, with a past medical history significant for hypertension, GERD, hypothyroidism, and skin cancer, who had presented with an abnormal Pap smear, she had a cervical biopsy done on 6/3/2022, revealing invasive nonkeratinizing squamous cell carcinoma, p16 positive, the patient was referred to Dr. Herlinda Calvert, she underwent on 7/19/2022 radical hysterectomy with bilateral salpingo-oophorectomy and bilateral pelvic lymphadenectomy, pathology was consistent with moderately differentiated squamous cell carcinoma, invading at least into the middle third, and close a portion of the outer third of the cervical stroma, maximum dimension of the tumor 2.5 cm, with depth of stromal invasion of 9 mm, lymphovascular invasion was present, focal, all margins were negative, the closest margin was the ectocervical, radial/circumferential and the distance from invasive carcinoma to closest margin is was 4 mm. High-grade squamous intraepithelial lesion was present at the margin. 2 premature lymph nodes were negative for metastasis, 5 pelvic lymph nodes were negative for metastasis. Final pathologic stage IB2. The patient had recovered well from the surgery. When she had blood work done on 6/23/2020, she was found to have a creatinine of 2, repeat creatinine from 7/20/2022 was 1.54. The patient was not aware of any kidney issues prior to that. On 9/7/2022, the patient was started on adjuvant chemo RT using weekly Taxol. Patient is doing well overall, she has diarrhea, taking the Imodium.     Review of Systems;  CONSTITUTIONAL: No fever, chills. Good appetite and energy level. ENMT: Eyes: No diplopia; Nose: No epistaxis. Mouth: No sore throat. RESPIRATORY: No hemoptysis, shortness of breath, cough. CARDIOVASCULAR: No chest pain, palpitations. GASTROINTESTINAL: Positive for nausea, no vomiting, no abdominal pain, positive for diarrhea. GENITOURINARY: No dysuria, urinary frequency, hematuria. NEURO: No syncope, presyncope, headache. Remainder:  ROS NEGATIVE     Past Medical History:  Past Medical History             Diagnosis Date    Abnormal Pap smear of cervix      Acid reflux      Arthritis      Hemorrhage 1982     s/p vaginal delivery     History of blood transfusion      Hypertension      Skin cancer      Thyroid disease               Patient Active Problem List   Diagnosis    Malignant neoplasm of overlapping sites of cervix [facility])         Past Surgical History:  Past Surgical History             Procedure Laterality Date    SKIN CANCER EXCISION        THYROID SURGERY         part of thyroid removal    THYROIDECTOMY, PARTIAL   1106-2047            Family History:  Family History         Family History   Problem Relation Age of Onset    Cancer Mother           Lung    Lung Cancer Mother              Medications:  Reviewed and reconciled.      Social History:  Social History               Socioeconomic History    Marital status:        Spouse name: Not on file    Number of children: 1    Years of education: Not on file    Highest education level: Not on file   Occupational History    Not on file   Tobacco Use    Smoking status: Never    Smokeless tobacco: Never   Vaping Use    Vaping Use: Never used   Substance and Sexual Activity    Alcohol use: Never    Drug use: Never    Sexual activity: Not on file   Other Topics Concern    Not on file   Social History Narrative     ** Merged History Encounter **            Social Determinants of Health      Financial Resource Strain: Not on file   Food Insecurity: Not on file Transportation Needs: Not on file   Physical Activity: Not on file   Stress: Not on file   Social Connections: Not on file   Intimate Partner Violence: Not on file   Housing Stability: Not on file            Allergies:  No Known Allergies     Physical Exam:  BP (!) 140/66   Pulse 100   Temp 97.6 °F (36.4 °C)   Resp (!) 99   Ht 5' 6\" (1.676 m)   Wt 206 lb 9.6 oz (93.7 kg)   BMI 33.35 kg/m²   GENERAL: Alert, oriented x 3, not in acute distress. HEENT: PERRLA; EOMI. Oropharynx clear. NECK: Supple. No palpable cervical or supraclavicular lymphadenopathy. LUNGS: Good air entry bilaterally. No wheezing, crackles or rhonchi. CARDIOVASCULAR: Regular rate. No murmurs, rubs or gallops. ABDOMEN: Incisions are healing nicely. Soft. Non-tender, non-distended. Positive bowel sounds. EXTREMITIES: Without clubbing, cyanosis, or edema. NEUROLOGIC: No focal deficits. ECOG PS 1        Impression/Plan:       Mrs. Jonathan Booth is a very pleasant 81-year-old lady, with a past medical history significant for hypertension, GERD, hypothyroidism, and skin cancer, who had presented with an abnormal Pap smear, she had a cervical biopsy done on 6/3/2022, revealing invasive nonkeratinizing squamous cell carcinoma, p16 positive, the patient was referred to Dr. Rula Hernandez, she underwent on 7/19/2022 radical hysterectomy with bilateral salpingo-oophorectomy and bilateral pelvic lymphadenectomy, pathology was consistent with moderately differentiated squamous cell carcinoma, invading at least into the middle third, and close a portion of the outer third of the cervical stroma, maximum dimension of the tumor 2.5 cm, with depth of stromal invasion of 9 mm, lymphovascular invasion was present, focal, all margins were negative, the closest margin was the ectocervical, radial/circumferential and the distance from invasive carcinoma to closest margin is was 4 mm. High-grade squamous intraepithelial lesion was present at the margin.   2 premature lymph nodes were negative for metastasis, 5 pelvic lymph nodes were negative for metastasis. Final pathologic stage IB2. The patient has a newly diagnosed cervical squamous cell carcinoma, p16 positive, she has intermediate risk disease, diagnosis and prognosis were discussed with the patient. Due to the presence of lymphovascular invasion, middle one third stromal invasion and tumor size greater than 2 cm, adjuvant chemo RT is recommended, due to the CKD, did not recommend cisplatin, we decided to treat with weekly Taxol for radiosensitization. Side effects were reviewed with the patient. On 9/7/2022, the patient was started on adjuvant chemo RT using weekly Taxol. She has received 3 cycles to date, she has diarrhea, continue Imodium, today 9/28/2022, labs reviewed, blood counts adequate for treatment, her sodium is normal, creatinine had increased from 1.4-1.6, she received intravenous hydration today and again on 9/30/2022, she is scheduled to see nephrology in mid October. Continue to hold Prinzide. RTC in 1 week. Thank you for allowing us to participate in the care of Mrs. Keely Fox.     Mesha Field MD   HEMATOLOGY/MEDICAL [address] MEDICAL ONCOLOGY  [address] [place], [place] [address] [address]  Dept: [phone] Dex: [phone]

## 2022-09-28 NOTE — PROGRESS NOTES
Breanna Pipe  9/28/2022  Wt Readings from Last 3 Encounters:   09/28/22 208 lb 6.4 oz (94.5 kg)   09/28/22 209 lb (94.8 kg)   09/21/22 209 lb 3.2 oz (94.9 kg)     Body mass index is 33.73 kg/m². Treatment Area:Pelvis (cervical cancer)    Patient was seen today for weekly visit. Comfort Alteration  KPS:80%  Fatigue: Mild    Mucous Membrane Alteration  Drainage: No  Drainage Odor: No  Vaginal Bleeding: No    Nutritional Alteration  Anorexia: No  Nausea: No  Vomiting: No     Elimination Alterations  Constipation: no  Diarrhea:  yes/ resolved with Imodium  Urinary Frequency/Urgency: No  Urinary Retention: No  Dysuria: No  Urinary Incontinence: No  Proctitis: No    Skin Alteration   Sensation:no    Radiation Dermatitis:  none    Emotional  Coping: effective    Sexuality Alteration  na    Injury, potential bleeding or infection: na    Lab Results   Component Value Date    WBC 5.4 09/27/2022     09/27/2022         /67   Pulse 97   Temp 97.7 °F (36.5 °C) (Tympanic)   Resp 20   Wt 209 lb (94.8 kg)   BMI 33.73 kg/m²   BP within normal range?  yes     Assessment/Plan:  Completed 15/27 fractions; 9434/4038 cGy    Christi Ham RN

## 2022-09-28 NOTE — PROGRESS NOTES
DEPARTMENT OF RADIATION ONCOLOGY   ON TREATMENT VISIT       9/28/2022      NAME:  Alise Burks    YOB: 1955    DIAGNOSIS:     SUBJECTIVE:   Alise Burks has now received 2700 cGy in 15 fractions directed to the pelvis. Past medical, surgical, social and family histories reviewed and updated as indicated. PAIN: No    ALLERGIES:  Patient has no known allergies. Current Outpatient Medications   Medication Sig Dispense Refill    prochlorperazine (COMPAZINE) 10 MG tablet Take 1 tablet by mouth every 6 hours as needed (nausea) 60 tablet 1    ondansetron (ZOFRAN-ODT) 4 MG disintegrating tablet Place 2 tablets under the tongue every 8 hours as needed for Nausea or Vomiting 21 tablet 1    acetaminophen (TYLENOL) 500 MG tablet Take 2 tablets by mouth every 6 hours as needed for Pain 60 tablet 0    lisinopril-hydroCHLOROthiazide (PRINZIDE;ZESTORETIC) 20-12.5 MG per tablet Take 1 tablet by mouth daily      OMEPRAZOLE PO Take 20 mg by mouth daily      Levothyroxine Sodium (SYNTHROID PO) Take by mouth daily      NONFORMULARY at bedtime Sleeping Pill      lisinopril-hydroCHLOROthiazide (PRINZIDE;ZESTORETIC) 20-25 MG per tablet TAKE 1 TABLET BY MOUTH IN THE MORNING      omeprazole (PRILOSEC) 20 MG delayed release capsule TAKE 1 CAPSULE BY MOUTH IN THE MORNING BEFORE BREAKFAST      traZODone (DESYREL) 50 MG tablet       ALPRAZolam (XANAX) 0.25 MG tablet       levothyroxine (SYNTHROID) 50 MCG tablet Take 50 mcg by mouth Daily       No current facility-administered medications for this encounter. Facility-Administered Medications Ordered in Other Encounters   Medication Dose Route Frequency Provider Last Rate Last Admin    0.9 % sodium chloride infusion  5-250 mL/hr IntraVENous PRN Preston Hadley MD        sodium chloride (PF) 0.9 % injection 5-40 mL  5-40 mL IntraVENous PRN Preston Hadley MD   10 mL at 09/28/22 1309         OBJECTIVE:  Alert and fully ambulatory. Pleasant and conversant. Physical Examination:General appearance - alert, well appearing, and in no distress, normal appearing weight, and well hydrated:  Constitutional: A well developed, well nourished 78 y.o. female who is alert, oriented, cooperative and in no apparent distress. HEENT:   Skin:  Warm and dry. No obvious rashes. Vitals:    09/28/22 1530   BP: 125/67   Pulse: 97   Resp: 20   Temp: 97.7 °F (36.5 °C)   TempSrc: Tympanic   Weight: 209 lb (94.8 kg)       Wt Readings from Last 3 Encounters:   09/28/22 208 lb 6.4 oz (94.5 kg)   09/28/22 209 lb (94.8 kg)   09/21/22 209 lb 3.2 oz (94.9 kg)       ASSESSMENT/PLAN:     Patient is tolerating treatments well with expected toxicities. Sporadic diarrhea. Current and planned dose reviewed. Goals of treatment and potential side effects were reviewed with the patient. Treatment imaging has been personally reviewed for accuracy and precision. Questions answered to apparent satisfaction. Treatments will continue as planned. Thank you for the opportunity to participate in multidisciplinary management of this remarkable and pleasant patient.       Malika Fierro MD    Department of Radiation Oncology  PHYSICIANS [facility]) [facility]: [phone] ([facility]: [phone])  [facility]) [facility]: [phone] ([place]: [phone])  [address] [place] [address]) [facility]:  [phone] ([facility]:  [phone])

## 2022-09-29 ENCOUNTER — HOSPITAL ENCOUNTER (OUTPATIENT)
Dept: RADIATION ONCOLOGY | Age: 67
Discharge: HOME | End: 2022-09-29
Payer: COMMERCIAL

## 2022-09-29 PROCEDURE — 77386 HC NTSTY MODUL RAD TX DLVR CPLX: CPT | Performed by: RADIOLOGY

## 2022-09-29 PROCEDURE — 77014 PR CT GUIDANCE PLACEMENT RAD THERAPY FIELDS: CPT | Performed by: RADIOLOGY

## 2022-09-30 ENCOUNTER — HOSPITAL ENCOUNTER (OUTPATIENT)
Dept: RADIATION ONCOLOGY | Age: 67
Discharge: HOME | End: 2022-09-30
Payer: COMMERCIAL

## 2022-09-30 ENCOUNTER — TELEPHONE (OUTPATIENT)
Dept: INFUSION THERAPY | Age: 67
End: 2022-09-30

## 2022-09-30 ENCOUNTER — HOSPITAL ENCOUNTER (OUTPATIENT)
Dept: INFUSION THERAPY | Age: 67
Discharge: HOME | End: 2022-09-30
Payer: COMMERCIAL

## 2022-09-30 VITALS
HEART RATE: 83 BPM | TEMPERATURE: 98.1 F | SYSTOLIC BLOOD PRESSURE: 130 MMHG | OXYGEN SATURATION: 100 % | DIASTOLIC BLOOD PRESSURE: 64 MMHG

## 2022-09-30 DIAGNOSIS — C53.8 MALIGNANT NEOPLASM OF OVERLAPPING SITES OF CERVIX (HCC): Primary | ICD-10-CM

## 2022-09-30 PROCEDURE — 77386 HC NTSTY MODUL RAD TX DLVR CPLX: CPT | Performed by: RADIOLOGY

## 2022-09-30 PROCEDURE — 77014 PR CT GUIDANCE PLACEMENT RAD THERAPY FIELDS: CPT | Performed by: RADIOLOGY

## 2022-09-30 PROCEDURE — 96360 HYDRATION IV INFUSION INIT: CPT

## 2022-09-30 PROCEDURE — 2580000003 HC RX 258: Performed by: INTERNAL MEDICINE

## 2022-09-30 RX ORDER — 0.9 % SODIUM CHLORIDE 0.9 %
1000 INTRAVENOUS SOLUTION INTRAVENOUS ONCE
Status: CANCELLED | OUTPATIENT
Start: 2022-09-30 | End: 2022-09-30

## 2022-09-30 RX ORDER — SODIUM CHLORIDE 9 MG/ML
5-40 INJECTION INTRAVENOUS PRN
Status: DISCONTINUED | OUTPATIENT
Start: 2022-09-30 | End: 2022-10-01 | Stop reason: HOSPADM

## 2022-09-30 RX ORDER — SODIUM CHLORIDE 0.9 % (FLUSH) 0.9 %
5-40 SYRINGE (ML) INJECTION PRN
Status: CANCELLED | OUTPATIENT
Start: 2022-09-30

## 2022-09-30 RX ORDER — SODIUM CHLORIDE 9 MG/ML
5-250 INJECTION, SOLUTION INTRAVENOUS PRN
Status: CANCELLED | OUTPATIENT
Start: 2022-09-30

## 2022-09-30 RX ORDER — HEPARIN SODIUM (PORCINE) LOCK FLUSH IV SOLN 100 UNIT/ML 100 UNIT/ML
500 SOLUTION INTRAVENOUS PRN
Status: CANCELLED | OUTPATIENT
Start: 2022-09-30

## 2022-09-30 RX ORDER — 0.9 % SODIUM CHLORIDE 0.9 %
1000 INTRAVENOUS SOLUTION INTRAVENOUS ONCE
Status: COMPLETED | OUTPATIENT
Start: 2022-09-30 | End: 2022-09-30

## 2022-09-30 RX ADMIN — SODIUM CHLORIDE 1000 ML: 9 INJECTION, SOLUTION INTRAVENOUS at 09:07

## 2022-09-30 NOTE — TELEPHONE ENCOUNTER
Dr Paul Jennings office called to let Dr Gaines Soulier know that they did offer pt a 10/14/22 visit at 0900 and pt has not called them back to confirm this yet-This nurse spoke to pt while she was in our infusion area today about this and she states that she cannot go at that time d/t having a radiation appointment at that time-Pt states she has Dr Paul Jennings office number and that she will call them to schedule another date and time-Samantha with Dr Paul Jennings office was notified of this-SANJAY Coombs [place]

## 2022-10-03 ENCOUNTER — HOSPITAL ENCOUNTER (OUTPATIENT)
Dept: RADIATION ONCOLOGY | Age: 67
Discharge: HOME | End: 2022-10-03
Payer: COMMERCIAL

## 2022-10-03 ENCOUNTER — TELEPHONE (OUTPATIENT)
Dept: INFUSION THERAPY | Age: 67
End: 2022-10-03

## 2022-10-03 PROCEDURE — 77014 PR CT GUIDANCE PLACEMENT RAD THERAPY FIELDS: CPT | Performed by: RADIOLOGY

## 2022-10-03 PROCEDURE — 77386 HC NTSTY MODUL RAD TX DLVR CPLX: CPT | Performed by: RADIOLOGY

## 2022-10-04 ENCOUNTER — HOSPITAL ENCOUNTER (OUTPATIENT)
Dept: RADIATION ONCOLOGY | Age: 67
Discharge: HOME | End: 2022-10-04
Payer: COMMERCIAL

## 2022-10-04 ENCOUNTER — HOSPITAL ENCOUNTER (OUTPATIENT)
Dept: INFUSION THERAPY | Age: 67
Discharge: HOME | End: 2022-10-04
Payer: COMMERCIAL

## 2022-10-04 DIAGNOSIS — C53.8 MALIGNANT NEOPLASM OF OVERLAPPING SITES OF CERVIX (HCC): ICD-10-CM

## 2022-10-04 LAB
ALBUMIN SERPL-MCNC: 4.2 G/DL (ref 3.5–5.2)
ALP BLD-CCNC: 60 U/L (ref 35–104)
ALT SERPL-CCNC: 21 U/L (ref 0–32)
ANION GAP SERPL CALCULATED.3IONS-SCNC: 11 MMOL/L (ref 7–16)
AST SERPL-CCNC: 20 U/L (ref 0–31)
ATYPICAL LYMPHOCYTE RELATIVE PERCENT: 2.6 % (ref 0–4)
BASOPHILS ABSOLUTE: 0 E9/L (ref 0–0.2)
BASOPHILS RELATIVE PERCENT: 0 % (ref 0–2)
BILIRUB SERPL-MCNC: 0.3 MG/DL (ref 0–1.2)
BUN BLDV-MCNC: 9 MG/DL (ref 6–23)
CALCIUM SERPL-MCNC: 9.1 MG/DL (ref 8.6–10.2)
CHLORIDE BLD-SCNC: 104 MMOL/L (ref 98–107)
CO2: 25 MMOL/L (ref 22–29)
CREAT SERPL-MCNC: 1.1 MG/DL (ref 0.5–1)
EOSINOPHILS ABSOLUTE: 0.28 E9/L (ref 0.05–0.5)
EOSINOPHILS RELATIVE PERCENT: 7 % (ref 0–6)
GFR AFRICAN AMERICAN: >60
GFR NON-AFRICAN AMERICAN: 50 ML/MIN/1.73
GLUCOSE BLD-MCNC: 101 MG/DL (ref 74–99)
HCT VFR BLD CALC: 32 % (ref 34–48)
HEMOGLOBIN: 10.7 G/DL (ref 11.5–15.5)
LYMPHOCYTES ABSOLUTE: 0.36 E9/L (ref 1.5–4)
LYMPHOCYTES RELATIVE PERCENT: 6.2 % (ref 20–42)
MCH RBC QN AUTO: 29.9 PG (ref 26–35)
MCHC RBC AUTO-ENTMCNC: 33.4 % (ref 32–34.5)
MCV RBC AUTO: 89.4 FL (ref 80–99.9)
MONOCYTES ABSOLUTE: 0.16 E9/L (ref 0.1–0.95)
MONOCYTES RELATIVE PERCENT: 4.4 % (ref 2–12)
NEUTROPHILS ABSOLUTE: 3.2 E9/L (ref 1.8–7.3)
NEUTROPHILS RELATIVE PERCENT: 79.8 % (ref 43–80)
NUCLEATED RED BLOOD CELLS: 0 /100 WBC
OVALOCYTES: ABNORMAL
PDW BLD-RTO: 14 FL (ref 11.5–15)
PLATELET # BLD: 253 E9/L (ref 130–450)
PMV BLD AUTO: 10.2 FL (ref 7–12)
POIKILOCYTES: ABNORMAL
POTASSIUM SERPL-SCNC: 3.5 MMOL/L (ref 3.5–5)
RBC # BLD: 3.58 E12/L (ref 3.5–5.5)
SODIUM BLD-SCNC: 140 MMOL/L (ref 132–146)
TOTAL PROTEIN: 6.6 G/DL (ref 6.4–8.3)
WBC # BLD: 4 E9/L (ref 4.5–11.5)

## 2022-10-04 PROCEDURE — 77014 PR CT GUIDANCE PLACEMENT RAD THERAPY FIELDS: CPT | Performed by: RADIOLOGY

## 2022-10-04 PROCEDURE — 36415 COLL VENOUS BLD VENIPUNCTURE: CPT

## 2022-10-04 PROCEDURE — 80053 COMPREHEN METABOLIC PANEL: CPT

## 2022-10-04 PROCEDURE — 77386 HC NTSTY MODUL RAD TX DLVR CPLX: CPT | Performed by: RADIOLOGY

## 2022-10-04 PROCEDURE — 77427 RADIATION TX MANAGEMENT X5: CPT | Performed by: RADIOLOGY

## 2022-10-04 PROCEDURE — 85025 COMPLETE CBC W/AUTO DIFF WBC: CPT

## 2022-10-05 ENCOUNTER — HOSPITAL ENCOUNTER (OUTPATIENT)
Dept: INFUSION THERAPY | Age: 67
Discharge: HOME | End: 2022-10-05
Payer: COMMERCIAL

## 2022-10-05 ENCOUNTER — OFFICE VISIT (OUTPATIENT)
Dept: ONCOLOGY | Age: 67
End: 2022-10-05
Payer: COMMERCIAL

## 2022-10-05 ENCOUNTER — HOSPITAL ENCOUNTER (OUTPATIENT)
Dept: RADIATION ONCOLOGY | Age: 67
Discharge: HOME | End: 2022-10-05
Payer: COMMERCIAL

## 2022-10-05 VITALS
BODY MASS INDEX: 33.49 KG/M2 | TEMPERATURE: 97.7 F | HEART RATE: 96 BPM | DIASTOLIC BLOOD PRESSURE: 76 MMHG | HEIGHT: 66 IN | OXYGEN SATURATION: 99 % | SYSTOLIC BLOOD PRESSURE: 136 MMHG | WEIGHT: 208.4 LBS

## 2022-10-05 VITALS
WEIGHT: 209 LBS | OXYGEN SATURATION: 96 % | SYSTOLIC BLOOD PRESSURE: 138 MMHG | DIASTOLIC BLOOD PRESSURE: 74 MMHG | HEART RATE: 92 BPM | BODY MASS INDEX: 33.73 KG/M2 | TEMPERATURE: 97.8 F | RESPIRATION RATE: 18 BRPM

## 2022-10-05 VITALS — TEMPERATURE: 98.1 F | HEART RATE: 82 BPM | SYSTOLIC BLOOD PRESSURE: 113 MMHG | DIASTOLIC BLOOD PRESSURE: 65 MMHG

## 2022-10-05 DIAGNOSIS — C53.8 MALIGNANT NEOPLASM OF OVERLAPPING SITES OF CERVIX (HCC): Primary | ICD-10-CM

## 2022-10-05 PROCEDURE — 2580000003 HC RX 258: Performed by: INTERNAL MEDICINE

## 2022-10-05 PROCEDURE — 96413 CHEMO IV INFUSION 1 HR: CPT

## 2022-10-05 PROCEDURE — 1123F ACP DISCUSS/DSCN MKR DOCD: CPT | Performed by: INTERNAL MEDICINE

## 2022-10-05 PROCEDURE — 99214 OFFICE O/P EST MOD 30 MIN: CPT | Performed by: INTERNAL MEDICINE

## 2022-10-05 PROCEDURE — 2500000003 HC RX 250 WO HCPCS: Performed by: INTERNAL MEDICINE

## 2022-10-05 PROCEDURE — 77336 RADIATION PHYSICS CONSULT: CPT | Performed by: RADIOLOGY

## 2022-10-05 PROCEDURE — 77386 HC NTSTY MODUL RAD TX DLVR CPLX: CPT | Performed by: RADIOLOGY

## 2022-10-05 PROCEDURE — 77014 PR CT GUIDANCE PLACEMENT RAD THERAPY FIELDS: CPT | Performed by: RADIOLOGY

## 2022-10-05 PROCEDURE — 6360000002 HC RX W HCPCS: Performed by: INTERNAL MEDICINE

## 2022-10-05 PROCEDURE — 96375 TX/PRO/DX INJ NEW DRUG ADDON: CPT

## 2022-10-05 PROCEDURE — 96361 HYDRATE IV INFUSION ADD-ON: CPT

## 2022-10-05 RX ORDER — HEPARIN SODIUM (PORCINE) LOCK FLUSH IV SOLN 100 UNIT/ML 100 UNIT/ML
500 SOLUTION INTRAVENOUS PRN
Status: DISCONTINUED | OUTPATIENT
Start: 2022-10-05 | End: 2022-10-06 | Stop reason: HOSPADM

## 2022-10-05 RX ORDER — MEPERIDINE HYDROCHLORIDE 50 MG/ML
12.5 INJECTION INTRAMUSCULAR; INTRAVENOUS; SUBCUTANEOUS PRN
Status: CANCELLED | OUTPATIENT
Start: 2022-10-05

## 2022-10-05 RX ORDER — DEXAMETHASONE SODIUM PHOSPHATE 10 MG/ML
10 INJECTION INTRAMUSCULAR; INTRAVENOUS ONCE
Status: COMPLETED | OUTPATIENT
Start: 2022-10-05 | End: 2022-10-05

## 2022-10-05 RX ORDER — 0.9 % SODIUM CHLORIDE 0.9 %
1000 INTRAVENOUS SOLUTION INTRAVENOUS ONCE
OUTPATIENT
Start: 2022-10-05 | End: 2022-10-05

## 2022-10-05 RX ORDER — SODIUM CHLORIDE 9 MG/ML
5-40 INJECTION INTRAVENOUS PRN
Status: CANCELLED | OUTPATIENT
Start: 2022-10-05

## 2022-10-05 RX ORDER — 0.9 % SODIUM CHLORIDE 0.9 %
1000 INTRAVENOUS SOLUTION INTRAVENOUS ONCE
Status: COMPLETED | OUTPATIENT
Start: 2022-10-05 | End: 2022-10-05

## 2022-10-05 RX ORDER — ALBUTEROL SULFATE 90 UG/1
4 AEROSOL, METERED RESPIRATORY (INHALATION) PRN
Status: CANCELLED | OUTPATIENT
Start: 2022-10-05

## 2022-10-05 RX ORDER — SODIUM CHLORIDE 9 MG/ML
5-250 INJECTION, SOLUTION INTRAVENOUS PRN
Status: DISCONTINUED | OUTPATIENT
Start: 2022-10-05 | End: 2022-10-06 | Stop reason: HOSPADM

## 2022-10-05 RX ORDER — SODIUM CHLORIDE 9 MG/ML
5-250 INJECTION, SOLUTION INTRAVENOUS PRN
OUTPATIENT
Start: 2022-10-05

## 2022-10-05 RX ORDER — SODIUM CHLORIDE 9 MG/ML
INJECTION, SOLUTION INTRAVENOUS CONTINUOUS
Status: CANCELLED | OUTPATIENT
Start: 2022-10-05

## 2022-10-05 RX ORDER — SODIUM CHLORIDE 0.9 % (FLUSH) 0.9 %
5-40 SYRINGE (ML) INJECTION PRN
Status: CANCELLED | OUTPATIENT
Start: 2022-10-05

## 2022-10-05 RX ORDER — FAMOTIDINE 10 MG/ML
20 INJECTION, SOLUTION INTRAVENOUS
Status: CANCELLED | OUTPATIENT
Start: 2022-10-05

## 2022-10-05 RX ORDER — DIPHENHYDRAMINE HYDROCHLORIDE 50 MG/ML
50 INJECTION INTRAMUSCULAR; INTRAVENOUS
Status: CANCELLED | OUTPATIENT
Start: 2022-10-05

## 2022-10-05 RX ORDER — SODIUM CHLORIDE 9 MG/ML
5-40 INJECTION INTRAVENOUS PRN
Status: DISCONTINUED | OUTPATIENT
Start: 2022-10-05 | End: 2022-10-06 | Stop reason: HOSPADM

## 2022-10-05 RX ORDER — ONDANSETRON 2 MG/ML
8 INJECTION INTRAMUSCULAR; INTRAVENOUS ONCE
Status: COMPLETED | OUTPATIENT
Start: 2022-10-05 | End: 2022-10-05

## 2022-10-05 RX ORDER — SODIUM CHLORIDE 9 MG/ML
5-250 INJECTION, SOLUTION INTRAVENOUS PRN
Status: CANCELLED | OUTPATIENT
Start: 2022-10-05

## 2022-10-05 RX ORDER — ONDANSETRON 2 MG/ML
8 INJECTION INTRAMUSCULAR; INTRAVENOUS ONCE
Status: CANCELLED
Start: 2022-10-05 | End: 2022-10-05

## 2022-10-05 RX ORDER — ACETAMINOPHEN 325 MG/1
650 TABLET ORAL
Status: CANCELLED | OUTPATIENT
Start: 2022-10-05

## 2022-10-05 RX ORDER — EPINEPHRINE 1 MG/ML
0.3 INJECTION, SOLUTION, CONCENTRATE INTRAVENOUS PRN
Status: CANCELLED | OUTPATIENT
Start: 2022-10-05

## 2022-10-05 RX ORDER — FAMOTIDINE 10 MG/ML
20 INJECTION, SOLUTION INTRAVENOUS ONCE
Status: CANCELLED | OUTPATIENT
Start: 2022-10-05 | End: 2022-10-05

## 2022-10-05 RX ORDER — DIPHENHYDRAMINE HYDROCHLORIDE 50 MG/ML
25 INJECTION INTRAMUSCULAR; INTRAVENOUS ONCE
Status: COMPLETED | OUTPATIENT
Start: 2022-10-05 | End: 2022-10-05

## 2022-10-05 RX ORDER — DIPHENHYDRAMINE HYDROCHLORIDE 50 MG/ML
25 INJECTION INTRAMUSCULAR; INTRAVENOUS ONCE
Status: CANCELLED | OUTPATIENT
Start: 2022-10-05 | End: 2022-10-05

## 2022-10-05 RX ORDER — HEPARIN SODIUM (PORCINE) LOCK FLUSH IV SOLN 100 UNIT/ML 100 UNIT/ML
500 SOLUTION INTRAVENOUS PRN
OUTPATIENT
Start: 2022-10-05

## 2022-10-05 RX ORDER — HEPARIN SODIUM (PORCINE) LOCK FLUSH IV SOLN 100 UNIT/ML 100 UNIT/ML
500 SOLUTION INTRAVENOUS PRN
Status: CANCELLED | OUTPATIENT
Start: 2022-10-05

## 2022-10-05 RX ORDER — ONDANSETRON 2 MG/ML
8 INJECTION INTRAMUSCULAR; INTRAVENOUS
Status: CANCELLED | OUTPATIENT
Start: 2022-10-05

## 2022-10-05 RX ADMIN — SODIUM CHLORIDE 10 ML: 9 INJECTION, SOLUTION INTRAMUSCULAR; INTRAVENOUS; SUBCUTANEOUS at 10:03

## 2022-10-05 RX ADMIN — SODIUM CHLORIDE 1000 ML: 9 INJECTION, SOLUTION INTRAVENOUS at 09:59

## 2022-10-05 RX ADMIN — PACLITAXEL 102 MG: 6 INJECTION, SOLUTION, CONCENTRATE INTRAVENOUS at 10:44

## 2022-10-05 RX ADMIN — DIPHENHYDRAMINE HYDROCHLORIDE 25 MG: 50 INJECTION INTRAMUSCULAR; INTRAVENOUS at 10:03

## 2022-10-05 RX ADMIN — SODIUM CHLORIDE 10 ML: 9 INJECTION, SOLUTION INTRAMUSCULAR; INTRAVENOUS; SUBCUTANEOUS at 10:00

## 2022-10-05 RX ADMIN — SODIUM CHLORIDE 20 MG: 9 INJECTION, SOLUTION INTRAMUSCULAR; INTRAVENOUS; SUBCUTANEOUS at 10:11

## 2022-10-05 RX ADMIN — DEXAMETHASONE SODIUM PHOSPHATE 10 MG: 10 INJECTION INTRAMUSCULAR; INTRAVENOUS at 10:00

## 2022-10-05 RX ADMIN — ONDANSETRON 8 MG: 2 INJECTION INTRAMUSCULAR; INTRAVENOUS at 10:07

## 2022-10-05 RX ADMIN — SODIUM CHLORIDE 10 ML: 9 INJECTION, SOLUTION INTRAMUSCULAR; INTRAVENOUS; SUBCUTANEOUS at 10:07

## 2022-10-05 NOTE — PROGRESS NOTES
Nishant Monzon  10/5/2022  Wt Readings from Last 3 Encounters:   10/05/22 209 lb (94.8 kg)   09/28/22 208 lb 6.4 oz (94.5 kg)   09/28/22 209 lb (94.8 kg)     Body mass index is 33.73 kg/m². Treatment Area:pelvis    Patient was seen today for weekly visit. Comfort Alteration  KPS:90%  Fatigue: Mild    Mucous Membrane Alteration  Drainage: No  Drainage Odor: No  Vaginal Bleeding: No    Nutritional Alteration  Anorexia: No  Nausea: No  Vomiting: No     Elimination Alterations  Constipation: yes  Diarrhea:  yes  Urinary Frequency/Urgency: No  Urinary Retention: No  Dysuria: No  Urinary Incontinence: No  Proctitis: No    Skin Alteration   Sensation:good and using aquaphorna    Radiation Dermatitis:  na    Emotional  Coping: effective    Sexuality Alteration  na    Injury, potential bleeding or infection: na    Lab Results   Component Value Date    WBC 4.0 (L) 10/04/2022     10/04/2022         /74   Pulse 92   Temp 97.8 °F (36.6 °C) (Skin)   Resp 18   Wt 209 lb (94.8 kg)   SpO2 96%   BMI 33.73 kg/m²   BP within normal range?  yes   -        Assessment/Plan:20/27fx   3600/4860cGy and tolerating    Nleida Meneses RN

## 2022-10-05 NOTE — PROGRESS NOTES
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 2.5 cm in maximum dimension, intermediate risk, with 2 positive Sedlis factors (more than two third cervical stromal invasion, focal lymphovascular invasion)     SUBJECTIVE:   Lori Torres has now received 3600 cGy in 20 fractions directed to the pelvis. Past medical, surgical, social and family histories reviewed and updated as indicated. PAIN: No    ALLERGIES:  Patient has no known allergies. Current Outpatient Medications   Medication Sig Dispense Refill    prochlorperazine (COMPAZINE) 10 MG tablet Take 1 tablet by mouth every 6 hours as needed (nausea) 60 tablet 1    ondansetron (ZOFRAN-ODT) 4 MG disintegrating tablet Place 2 tablets under the tongue every 8 hours as needed for Nausea or Vomiting 21 tablet 1    acetaminophen (TYLENOL) 500 MG tablet Take 2 tablets by mouth every 6 hours as needed for Pain 60 tablet 0    lisinopril-hydroCHLOROthiazide (PRINZIDE;ZESTORETIC) 20-12.5 MG per tablet Take 1 tablet by mouth daily      OMEPRAZOLE PO Take 20 mg by mouth daily      Levothyroxine Sodium (SYNTHROID PO) Take by mouth daily      NONFORMULARY at bedtime Sleeping Pill      lisinopril-hydroCHLOROthiazide (PRINZIDE;ZESTORETIC) 20-25 MG per tablet TAKE 1 TABLET BY MOUTH IN THE MORNING      omeprazole (PRILOSEC) 20 MG delayed release capsule TAKE 1 CAPSULE BY MOUTH IN THE MORNING BEFORE BREAKFAST      traZODone (DESYREL) 50 MG tablet       ALPRAZolam (XANAX) 0.25 MG tablet       levothyroxine (SYNTHROID) 50 MCG tablet Take 50 mcg by mouth Daily       No current facility-administered medications for this encounter. OBJECTIVE:  Alert and fully ambulatory. Pleasant and conversant.       Physical Examination:General appearance - alert, well appearing, and in no distress, overweight, and well hydrated:  Constitutional: A well developed, well nourished 78 y.o. female who is alert, oriented, cooperative and in no apparent distress. HEENT:   Skin:  Warm and dry. No obvious rashes. Vitals:    10/05/22 0840   BP: 138/74   Pulse: 92   Resp: 18   Temp: 97.8 °F (36.6 °C)   TempSrc: Skin   SpO2: 96%   Weight: 209 lb (94.8 kg)       Wt Readings from Last 3 Encounters:   10/05/22 209 lb (94.8 kg)   09/28/22 208 lb 6.4 oz (94.5 kg)   09/28/22 209 lb (94.8 kg)       ASSESSMENT/PLAN:     Patient is tolerating treatments well with expected toxicities. No more diarrhea. Continue XRT    Current and planned dose reviewed. Goals of treatment and potential side effects were reviewed with the patient. Treatment imaging has been personally reviewed for accuracy and precision. Questions answered to apparent satisfaction. Treatments will continue as planned. Thank you for the opportunity to participate in multidisciplinary management of this remarkable and pleasant patient.       Rey Lee MD    Department of Radiation Oncology  PH[facility]IC[facility]ANS [facility]) [facility]: [phone] (OKD: [phone])  [address] [place] [address]: [phone] (EBW: [phone])  [facility]:  [phone] (PCY:  [phone])

## 2022-10-05 NOTE — PROGRESS NOTES
Patient tolerated anti-cancer therapy treatment well without complaint. IV flushed per protocol and de-accessed. Patient discharged ambulatory. VSS.

## 2022-10-05 NOTE — PROGRESS NOTES
[address] [facility] MEDICAL ONCOLOGY  [address] [place] [address]  Dept: [phone] David Dex: [phone]  Attending progress note       Reason for Visit:   Cervical cancer. Referring Physician:  Guido Fay MD     PCP:  JULIO Choudhary     History of Present Illness:      Mrs. Mandi Hansen is a very pleasant 20-year-old lady, with a past medical history significant for hypertension, GERD, hypothyroidism, and skin cancer, who had presented with an abnormal Pap smear, she had a cervical biopsy done on 6/3/2022, revealing invasive nonkeratinizing squamous cell carcinoma, p16 positive, the patient was referred to Dr. Elisabeth Pritchard, she underwent on 7/19/2022 radical hysterectomy with bilateral salpingo-oophorectomy and bilateral pelvic lymphadenectomy, pathology was consistent with moderately differentiated squamous cell carcinoma, invading at least into the middle third, and close a portion of the outer third of the cervical stroma, maximum dimension of the tumor 2.5 cm, with depth of stromal invasion of 9 mm, lymphovascular invasion was present, focal, all margins were negative, the closest margin was the ectocervical, radial/circumferential and the distance from invasive carcinoma to closest margin is was 4 mm. High-grade squamous intraepithelial lesion was present at the margin. 2 premature lymph nodes were negative for metastasis, 5 pelvic lymph nodes were negative for metastasis. Final pathologic stage IB2. The patient had recovered well from the surgery. When she had blood work done on 6/23/2020, she was found to have a creatinine of 2, repeat creatinine from 7/20/2022 was 1.54. The patient was not aware of any kidney issues prior to that. On 9/7/2022, the patient was started on adjuvant chemo RT using weekly Taxol. Patient is doing well overall, the diarrhea has resolved.     Review of Systems;  CONSTITUTIONAL: No fever, chills. Good appetite and energy level. ENMT: Eyes: No diplopia; Nose: No epistaxis. Mouth: No sore throat. RESPIRATORY: No hemoptysis, shortness of breath, cough. CARDIOVASCULAR: No chest pain, palpitations. GASTROINTESTINAL: Positive for nausea, no vomiting, no abdominal pain, no diarrhea. GENITOURINARY: No dysuria, urinary frequency, hematuria. NEURO: No syncope, presyncope, headache. Remainder:  ROS NEGATIVE     Past Medical History:  Past Medical History             Diagnosis Date    Abnormal Pap smear of cervix      Acid reflux      Arthritis      Hemorrhage 1982     s/p vaginal delivery     History of blood transfusion      Hypertension      Skin cancer      Thyroid disease               Patient Active Problem List   Diagnosis    Malignant neoplasm of overlapping sites of cervix [facility])         Past Surgical History:  Past Surgical History             Procedure Laterality Date    SKIN CANCER EXCISION        THYROID SURGERY         part of thyroid removal    THYROIDECTOMY, PARTIAL   [id number]            Family History:  Family History         Family History   Problem Relation Age of Onset    Cancer Mother           Lung    Lung Cancer Mother              Medications:  Reviewed and reconciled.      Social History:  Social History               Socioeconomic History    Marital status:        Spouse name: Not on file    Number of children: 1    Years of education: Not on file    Highest education level: Not on file   Occupational History    Not on file   Tobacco Use    Smoking status: Never    Smokeless tobacco: Never   Vaping Use    Vaping Use: Never used   Substance and Sexual Activity    Alcohol use: Never    Drug use: Never    Sexual activity: Not on file   Other Topics Concern    Not on file   Social History Narrative     ** Merged History Encounter **            Social Determinants of Health      Financial Resource Strain: Not on file   Food Insecurity: Not on file   Transportation Needs: Not on file   Physical Activity: Not on file   Stress: Not on file   Social Connections: Not on file   Intimate Partner Violence: Not on file   Housing Stability: Not on file            Allergies:  No Known Allergies     Physical Exam:  BP (!) 140/66   Pulse 100   Temp 97.6 °F (36.4 °C)   Resp (!) 99   Ht 5' 6\" (1.676 m)   Wt 206 lb 9.6 oz (93.7 kg)   BMI 33.35 kg/m²   GENERAL: Alert, oriented x 3, not in acute distress. HEENT: PERRLA; EOMI. Oropharynx clear. NECK: Supple. No palpable cervical or supraclavicular lymphadenopathy. LUNGS: Good air entry bilaterally. No wheezing, crackles or rhonchi. CARDIOVASCULAR: Regular rate. No murmurs, rubs or gallops. ABDOMEN: Incisions are healing nicely. Soft. Non-tender, non-distended. Positive bowel sounds. EXTREMITIES: Without clubbing, cyanosis, or edema. NEUROLOGIC: No focal deficits. ECOG PS 1        Impression/Plan:       Mrs. Prieto Jimenez is a very pleasant 73-year-old lady, with a past medical history significant for hypertension, GERD, hypothyroidism, and skin cancer, who had presented with an abnormal Pap smear, she had a cervical biopsy done on 6/3/2022, revealing invasive nonkeratinizing squamous cell carcinoma, p16 positive, the patient was referred to Dr. Wade coleman, she underwent on 7/19/2022 radical hysterectomy with bilateral salpingo-oophorectomy and bilateral pelvic lymphadenectomy, pathology was consistent with moderately differentiated squamous cell carcinoma, invading at least into the middle third, and close a portion of the outer third of the cervical stroma, maximum dimension of the tumor 2.5 cm, with depth of stromal invasion of 9 mm, lymphovascular invasion was present, focal, all margins were negative, the closest margin was the ectocervical, radial/circumferential and the distance from invasive carcinoma to closest margin is was 4 mm. High-grade squamous intraepithelial lesion was present at the margin.   2 premature lymph nodes were negative for metastasis, 5 pelvic lymph nodes were negative for metastasis. Final pathologic stage IB2. The patient has a newly diagnosed cervical squamous cell carcinoma, p16 positive, she has intermediate risk disease, diagnosis and prognosis were discussed with the patient. Due to the presence of lymphovascular invasion, middle one third stromal invasion and tumor size greater than 2 cm, adjuvant chemo RT is recommended, due to the CKD, did not recommend cisplatin, we decided to treat with weekly Taxol for radiosensitization. Side effects were reviewed with the patient. On 9/7/2022, the patient was started on adjuvant chemo RT using weekly Taxol. She has received 4 cycles to date, today 10/5/2022, labs reviewed, blood counts are adequate for treatment, her sodium is normal, had improved, proceed with Taxol, cycle #5. She is scheduled to see nephrology in mid October. Continue to hold Prinzide. RTC in 1 week. Thank you for allowing us to participate in the care of Mrs. Jessica Greenberg.     Ana Merlos MD   HEMATOLOGY/MEDICAL [address] MEDICAL ONCOLOGY  [address] [place] [address]  Dept: [phone] Dex: [phone]

## 2022-10-06 ENCOUNTER — HOSPITAL ENCOUNTER (OUTPATIENT)
Dept: RADIATION ONCOLOGY | Age: 67
Discharge: HOME | End: 2022-10-06
Payer: COMMERCIAL

## 2022-10-06 PROCEDURE — 77386 HC NTSTY MODUL RAD TX DLVR CPLX: CPT | Performed by: RADIOLOGY

## 2022-10-06 PROCEDURE — 77014 PR CT GUIDANCE PLACEMENT RAD THERAPY FIELDS: CPT | Performed by: RADIOLOGY

## 2022-10-07 ENCOUNTER — HOSPITAL ENCOUNTER (OUTPATIENT)
Dept: RADIATION ONCOLOGY | Age: 67
Discharge: HOME | End: 2022-10-07
Payer: COMMERCIAL

## 2022-10-07 PROCEDURE — 77014 PR CT GUIDANCE PLACEMENT RAD THERAPY FIELDS: CPT | Performed by: RADIOLOGY

## 2022-10-07 PROCEDURE — 77386 HC NTSTY MODUL RAD TX DLVR CPLX: CPT | Performed by: RADIOLOGY

## 2022-10-10 ENCOUNTER — HOSPITAL ENCOUNTER (OUTPATIENT)
Dept: RADIATION ONCOLOGY | Age: 67
Discharge: HOME | End: 2022-10-10
Payer: COMMERCIAL

## 2022-10-10 PROCEDURE — 77386 HC NTSTY MODUL RAD TX DLVR CPLX: CPT | Performed by: RADIOLOGY

## 2022-10-10 PROCEDURE — 77014 PR CT GUIDANCE PLACEMENT RAD THERAPY FIELDS: CPT | Performed by: RADIOLOGY

## 2022-10-11 ENCOUNTER — HOSPITAL ENCOUNTER (OUTPATIENT)
Dept: RADIATION ONCOLOGY | Age: 67
Discharge: HOME | End: 2022-10-11
Payer: COMMERCIAL

## 2022-10-11 ENCOUNTER — HOSPITAL ENCOUNTER (OUTPATIENT)
Dept: INFUSION THERAPY | Age: 67
Discharge: HOME | End: 2022-10-11
Payer: COMMERCIAL

## 2022-10-11 ENCOUNTER — TELEPHONE (OUTPATIENT)
Dept: INFUSION THERAPY | Age: 67
End: 2022-10-11

## 2022-10-11 VITALS
RESPIRATION RATE: 16 BRPM | DIASTOLIC BLOOD PRESSURE: 77 MMHG | SYSTOLIC BLOOD PRESSURE: 159 MMHG | OXYGEN SATURATION: 97 % | TEMPERATURE: 97.5 F | HEART RATE: 118 BPM

## 2022-10-11 DIAGNOSIS — C53.8 MALIGNANT NEOPLASM OF OVERLAPPING SITES OF CERVIX (HCC): Primary | ICD-10-CM

## 2022-10-11 DIAGNOSIS — C53.8 MALIGNANT NEOPLASM OF OVERLAPPING SITES OF CERVIX (HCC): ICD-10-CM

## 2022-10-11 LAB
ALBUMIN SERPL-MCNC: 4.2 G/DL (ref 3.5–5.2)
ALP BLD-CCNC: 61 U/L (ref 35–104)
ALT SERPL-CCNC: 24 U/L (ref 0–32)
ANION GAP SERPL CALCULATED.3IONS-SCNC: 13 MMOL/L (ref 7–16)
AST SERPL-CCNC: 22 U/L (ref 0–31)
ATYPICAL LYMPHOCYTE RELATIVE PERCENT: 0.9 % (ref 0–4)
BASOPHILS ABSOLUTE: 0.03 E9/L (ref 0–0.2)
BASOPHILS RELATIVE PERCENT: 0.9 % (ref 0–2)
BILIRUB SERPL-MCNC: 0.3 MG/DL (ref 0–1.2)
BUN BLDV-MCNC: 13 MG/DL (ref 6–23)
CALCIUM SERPL-MCNC: 8.8 MG/DL (ref 8.6–10.2)
CHLORIDE BLD-SCNC: 102 MMOL/L (ref 98–107)
CO2: 25 MMOL/L (ref 22–29)
CREAT SERPL-MCNC: 1 MG/DL (ref 0.5–1)
EOSINOPHILS ABSOLUTE: 0.23 E9/L (ref 0.05–0.5)
EOSINOPHILS RELATIVE PERCENT: 6.1 % (ref 0–6)
GFR AFRICAN AMERICAN: >60
GFR NON-AFRICAN AMERICAN: 55 ML/MIN/1.73
GLUCOSE BLD-MCNC: 111 MG/DL (ref 74–99)
HCT VFR BLD CALC: 32.4 % (ref 34–48)
HEMOGLOBIN: 11 G/DL (ref 11.5–15.5)
LYMPHOCYTES ABSOLUTE: 0.42 E9/L (ref 1.5–4)
LYMPHOCYTES RELATIVE PERCENT: 9.6 % (ref 20–42)
MCH RBC QN AUTO: 30.6 PG (ref 26–35)
MCHC RBC AUTO-ENTMCNC: 34 % (ref 32–34.5)
MCV RBC AUTO: 90 FL (ref 80–99.9)
MONOCYTES ABSOLUTE: 0.27 E9/L (ref 0.1–0.95)
MONOCYTES RELATIVE PERCENT: 6.9 % (ref 2–12)
NEUTROPHILS ABSOLUTE: 2.89 E9/L (ref 1.8–7.3)
NEUTROPHILS RELATIVE PERCENT: 75.6 % (ref 43–80)
NUCLEATED RED BLOOD CELLS: 0 /100 WBC
OVALOCYTES: ABNORMAL
PDW BLD-RTO: 14.2 FL (ref 11.5–15)
PLATELET # BLD: 265 E9/L (ref 130–450)
PMV BLD AUTO: 10.1 FL (ref 7–12)
POIKILOCYTES: ABNORMAL
POLYCHROMASIA: ABNORMAL
POTASSIUM SERPL-SCNC: 2.9 MMOL/L (ref 3.5–5)
RBC # BLD: 3.6 E12/L (ref 3.5–5.5)
SODIUM BLD-SCNC: 140 MMOL/L (ref 132–146)
TOTAL PROTEIN: 6.6 G/DL (ref 6.4–8.3)
WBC # BLD: 3.8 E9/L (ref 4.5–11.5)

## 2022-10-11 PROCEDURE — 77014 PR CT GUIDANCE PLACEMENT RAD THERAPY FIELDS: CPT | Performed by: RADIOLOGY

## 2022-10-11 PROCEDURE — 96365 THER/PROPH/DIAG IV INF INIT: CPT

## 2022-10-11 PROCEDURE — 77386 HC NTSTY MODUL RAD TX DLVR CPLX: CPT | Performed by: RADIOLOGY

## 2022-10-11 PROCEDURE — 96366 THER/PROPH/DIAG IV INF ADDON: CPT

## 2022-10-11 PROCEDURE — 36415 COLL VENOUS BLD VENIPUNCTURE: CPT

## 2022-10-11 PROCEDURE — 85025 COMPLETE CBC W/AUTO DIFF WBC: CPT

## 2022-10-11 PROCEDURE — 6370000000 HC RX 637 (ALT 250 FOR IP): Performed by: INTERNAL MEDICINE

## 2022-10-11 PROCEDURE — 80053 COMPREHEN METABOLIC PANEL: CPT

## 2022-10-11 PROCEDURE — 2580000003 HC RX 258: Performed by: INTERNAL MEDICINE

## 2022-10-11 PROCEDURE — 96376 TX/PRO/DX INJ SAME DRUG ADON: CPT

## 2022-10-11 PROCEDURE — 6360000002 HC RX W HCPCS: Performed by: INTERNAL MEDICINE

## 2022-10-11 PROCEDURE — 96374 THER/PROPH/DIAG INJ IV PUSH: CPT

## 2022-10-11 RX ORDER — HEPARIN SODIUM (PORCINE) LOCK FLUSH IV SOLN 100 UNIT/ML 100 UNIT/ML
500 SOLUTION INTRAVENOUS PRN
OUTPATIENT
Start: 2022-10-11

## 2022-10-11 RX ORDER — POTASSIUM CHLORIDE 7.45 MG/ML
10 INJECTION INTRAVENOUS
Status: DISPENSED | OUTPATIENT
Start: 2022-10-11 | End: 2022-10-11

## 2022-10-11 RX ORDER — SODIUM CHLORIDE 0.9 % (FLUSH) 0.9 %
5-40 SYRINGE (ML) INJECTION PRN
OUTPATIENT
Start: 2022-10-11

## 2022-10-11 RX ORDER — 0.9 % SODIUM CHLORIDE 0.9 %
1000 INTRAVENOUS SOLUTION INTRAVENOUS ONCE
OUTPATIENT
Start: 2022-10-11 | End: 2022-10-11

## 2022-10-11 RX ORDER — POTASSIUM CHLORIDE 7.45 MG/ML
10 INJECTION INTRAVENOUS
Status: COMPLETED | OUTPATIENT
Start: 2022-10-11 | End: 2022-10-11

## 2022-10-11 RX ORDER — SODIUM CHLORIDE 0.9 % (FLUSH) 0.9 %
5-40 SYRINGE (ML) INJECTION PRN
Status: DISCONTINUED | OUTPATIENT
Start: 2022-10-11 | End: 2022-10-12 | Stop reason: HOSPADM

## 2022-10-11 RX ORDER — POTASSIUM CHLORIDE 750 MG/1
40 TABLET, EXTENDED RELEASE ORAL ONCE
Status: COMPLETED | OUTPATIENT
Start: 2022-10-11 | End: 2022-10-11

## 2022-10-11 RX ORDER — SODIUM CHLORIDE 9 MG/ML
5-250 INJECTION, SOLUTION INTRAVENOUS PRN
OUTPATIENT
Start: 2022-10-11

## 2022-10-11 RX ADMIN — POTASSIUM CHLORIDE 10 MEQ: 7.46 INJECTION, SOLUTION INTRAVENOUS at 14:26

## 2022-10-11 RX ADMIN — SODIUM CHLORIDE, PRESERVATIVE FREE 10 ML: 5 INJECTION INTRAVENOUS at 13:16

## 2022-10-11 RX ADMIN — POTASSIUM CHLORIDE 40 MEQ: 750 TABLET, EXTENDED RELEASE ORAL at 13:19

## 2022-10-11 RX ADMIN — POTASSIUM CHLORIDE 10 MEQ: 7.46 INJECTION, SOLUTION INTRAVENOUS at 13:29

## 2022-10-12 ENCOUNTER — OFFICE VISIT (OUTPATIENT)
Dept: ONCOLOGY | Age: 67
End: 2022-10-12
Payer: COMMERCIAL

## 2022-10-12 ENCOUNTER — HOSPITAL ENCOUNTER (OUTPATIENT)
Dept: RADIATION ONCOLOGY | Age: 67
Discharge: HOME | End: 2022-10-12
Payer: COMMERCIAL

## 2022-10-12 ENCOUNTER — HOSPITAL ENCOUNTER (OUTPATIENT)
Dept: INFUSION THERAPY | Age: 67
Discharge: HOME | End: 2022-10-12
Payer: COMMERCIAL

## 2022-10-12 VITALS
BODY MASS INDEX: 33.72 KG/M2 | HEIGHT: 66 IN | DIASTOLIC BLOOD PRESSURE: 74 MMHG | OXYGEN SATURATION: 99 % | SYSTOLIC BLOOD PRESSURE: 176 MMHG | TEMPERATURE: 97.5 F | WEIGHT: 209.8 LBS | HEART RATE: 92 BPM

## 2022-10-12 VITALS — DIASTOLIC BLOOD PRESSURE: 72 MMHG | RESPIRATION RATE: 16 BRPM | SYSTOLIC BLOOD PRESSURE: 131 MMHG | HEART RATE: 78 BPM

## 2022-10-12 VITALS
DIASTOLIC BLOOD PRESSURE: 71 MMHG | RESPIRATION RATE: 18 BRPM | SYSTOLIC BLOOD PRESSURE: 146 MMHG | BODY MASS INDEX: 33.89 KG/M2 | TEMPERATURE: 97.9 F | HEART RATE: 77 BPM | WEIGHT: 210 LBS

## 2022-10-12 DIAGNOSIS — C53.8 MALIGNANT NEOPLASM OF OVERLAPPING SITES OF CERVIX (HCC): Primary | ICD-10-CM

## 2022-10-12 DIAGNOSIS — C53.8 MALIGNANT NEOPLASM OF OVERLAPPING SITES OF CERVIX (HCC): ICD-10-CM

## 2022-10-12 LAB
ALBUMIN SERPL-MCNC: 4.2 G/DL (ref 3.5–5.2)
ALP BLD-CCNC: 62 U/L (ref 35–104)
ALT SERPL-CCNC: 25 U/L (ref 0–32)
ANION GAP SERPL CALCULATED.3IONS-SCNC: 12 MMOL/L (ref 7–16)
AST SERPL-CCNC: 21 U/L (ref 0–31)
BILIRUB SERPL-MCNC: 0.3 MG/DL (ref 0–1.2)
BUN BLDV-MCNC: 11 MG/DL (ref 6–23)
CALCIUM SERPL-MCNC: 8.9 MG/DL (ref 8.6–10.2)
CHLORIDE BLD-SCNC: 103 MMOL/L (ref 98–107)
CO2: 24 MMOL/L (ref 22–29)
CREAT SERPL-MCNC: 1 MG/DL (ref 0.5–1)
GFR AFRICAN AMERICAN: >60
GFR NON-AFRICAN AMERICAN: 55 ML/MIN/1.73
GLUCOSE BLD-MCNC: 107 MG/DL (ref 74–99)
POTASSIUM SERPL-SCNC: 3.3 MMOL/L (ref 3.5–5)
SODIUM BLD-SCNC: 139 MMOL/L (ref 132–146)
TOTAL PROTEIN: 6.8 G/DL (ref 6.4–8.3)

## 2022-10-12 PROCEDURE — 96375 TX/PRO/DX INJ NEW DRUG ADDON: CPT

## 2022-10-12 PROCEDURE — 1123F ACP DISCUSS/DSCN MKR DOCD: CPT | Performed by: INTERNAL MEDICINE

## 2022-10-12 PROCEDURE — 36415 COLL VENOUS BLD VENIPUNCTURE: CPT

## 2022-10-12 PROCEDURE — 2580000003 HC RX 258: Performed by: INTERNAL MEDICINE

## 2022-10-12 PROCEDURE — 77014 PR CT GUIDANCE PLACEMENT RAD THERAPY FIELDS: CPT | Performed by: RADIOLOGY

## 2022-10-12 PROCEDURE — 77336 RADIATION PHYSICS CONSULT: CPT | Performed by: RADIOLOGY

## 2022-10-12 PROCEDURE — 6370000000 HC RX 637 (ALT 250 FOR IP): Performed by: INTERNAL MEDICINE

## 2022-10-12 PROCEDURE — 77386 HC NTSTY MODUL RAD TX DLVR CPLX: CPT | Performed by: RADIOLOGY

## 2022-10-12 PROCEDURE — 99214 OFFICE O/P EST MOD 30 MIN: CPT | Performed by: INTERNAL MEDICINE

## 2022-10-12 PROCEDURE — 96413 CHEMO IV INFUSION 1 HR: CPT

## 2022-10-12 PROCEDURE — 2500000003 HC RX 250 WO HCPCS: Performed by: INTERNAL MEDICINE

## 2022-10-12 PROCEDURE — A4216 STERILE WATER/SALINE, 10 ML: HCPCS | Performed by: INTERNAL MEDICINE

## 2022-10-12 PROCEDURE — 77427 RADIATION TX MANAGEMENT X5: CPT | Performed by: RADIOLOGY

## 2022-10-12 PROCEDURE — 6360000002 HC RX W HCPCS: Performed by: INTERNAL MEDICINE

## 2022-10-12 PROCEDURE — 80053 COMPREHEN METABOLIC PANEL: CPT

## 2022-10-12 RX ORDER — POTASSIUM CHLORIDE 750 MG/1
20 TABLET, EXTENDED RELEASE ORAL ONCE
Status: COMPLETED | OUTPATIENT
Start: 2022-10-12 | End: 2022-10-12

## 2022-10-12 RX ORDER — FAMOTIDINE 10 MG/ML
20 INJECTION, SOLUTION INTRAVENOUS
Status: CANCELLED | OUTPATIENT
Start: 2022-10-12

## 2022-10-12 RX ORDER — ONDANSETRON 2 MG/ML
8 INJECTION INTRAMUSCULAR; INTRAVENOUS
Status: CANCELLED | OUTPATIENT
Start: 2022-10-12

## 2022-10-12 RX ORDER — SODIUM CHLORIDE 9 MG/ML
5-250 INJECTION, SOLUTION INTRAVENOUS PRN
Status: DISCONTINUED | OUTPATIENT
Start: 2022-10-12 | End: 2022-10-13 | Stop reason: HOSPADM

## 2022-10-12 RX ORDER — SODIUM CHLORIDE 9 MG/ML
INJECTION, SOLUTION INTRAVENOUS CONTINUOUS
Status: CANCELLED | OUTPATIENT
Start: 2022-10-12

## 2022-10-12 RX ORDER — FAMOTIDINE 10 MG/ML
20 INJECTION, SOLUTION INTRAVENOUS ONCE
Status: CANCELLED | OUTPATIENT
Start: 2022-10-12 | End: 2022-10-12

## 2022-10-12 RX ORDER — LISINOPRIL 20 MG/1
TABLET ORAL
COMMUNITY
Start: 2022-10-07

## 2022-10-12 RX ORDER — ACETAMINOPHEN 325 MG/1
650 TABLET ORAL
Status: CANCELLED | OUTPATIENT
Start: 2022-10-12

## 2022-10-12 RX ORDER — EPINEPHRINE 1 MG/ML
0.3 INJECTION, SOLUTION, CONCENTRATE INTRAVENOUS PRN
Status: CANCELLED | OUTPATIENT
Start: 2022-10-12

## 2022-10-12 RX ORDER — DIPHENHYDRAMINE HYDROCHLORIDE 50 MG/ML
50 INJECTION INTRAMUSCULAR; INTRAVENOUS
Status: CANCELLED | OUTPATIENT
Start: 2022-10-12

## 2022-10-12 RX ORDER — SODIUM CHLORIDE 9 MG/ML
5-250 INJECTION, SOLUTION INTRAVENOUS PRN
Status: CANCELLED | OUTPATIENT
Start: 2022-10-12

## 2022-10-12 RX ORDER — MEPERIDINE HYDROCHLORIDE 50 MG/ML
12.5 INJECTION INTRAMUSCULAR; INTRAVENOUS; SUBCUTANEOUS PRN
Status: CANCELLED | OUTPATIENT
Start: 2022-10-12

## 2022-10-12 RX ORDER — DIPHENHYDRAMINE HYDROCHLORIDE 50 MG/ML
25 INJECTION INTRAMUSCULAR; INTRAVENOUS ONCE
Status: COMPLETED | OUTPATIENT
Start: 2022-10-12 | End: 2022-10-12

## 2022-10-12 RX ORDER — DIPHENHYDRAMINE HYDROCHLORIDE 50 MG/ML
25 INJECTION INTRAMUSCULAR; INTRAVENOUS ONCE
Status: CANCELLED | OUTPATIENT
Start: 2022-10-12 | End: 2022-10-12

## 2022-10-12 RX ORDER — HEPARIN SODIUM (PORCINE) LOCK FLUSH IV SOLN 100 UNIT/ML 100 UNIT/ML
500 SOLUTION INTRAVENOUS PRN
Status: CANCELLED | OUTPATIENT
Start: 2022-10-12

## 2022-10-12 RX ORDER — SODIUM CHLORIDE 0.9 % (FLUSH) 0.9 %
5-40 SYRINGE (ML) INJECTION PRN
Status: DISCONTINUED | OUTPATIENT
Start: 2022-10-12 | End: 2022-10-13 | Stop reason: HOSPADM

## 2022-10-12 RX ORDER — ONDANSETRON 2 MG/ML
8 INJECTION INTRAMUSCULAR; INTRAVENOUS ONCE
Status: CANCELLED
Start: 2022-10-12 | End: 2022-10-12

## 2022-10-12 RX ORDER — ALBUTEROL SULFATE 90 UG/1
4 AEROSOL, METERED RESPIRATORY (INHALATION) PRN
Status: CANCELLED | OUTPATIENT
Start: 2022-10-12

## 2022-10-12 RX ORDER — ONDANSETRON 2 MG/ML
8 INJECTION INTRAMUSCULAR; INTRAVENOUS ONCE
Status: COMPLETED | OUTPATIENT
Start: 2022-10-12 | End: 2022-10-12

## 2022-10-12 RX ORDER — SODIUM CHLORIDE 9 MG/ML
5-40 INJECTION INTRAVENOUS PRN
Status: CANCELLED | OUTPATIENT
Start: 2022-10-12

## 2022-10-12 RX ORDER — SODIUM CHLORIDE 0.9 % (FLUSH) 0.9 %
5-40 SYRINGE (ML) INJECTION PRN
Status: CANCELLED | OUTPATIENT
Start: 2022-10-12

## 2022-10-12 RX ORDER — DEXAMETHASONE SODIUM PHOSPHATE 10 MG/ML
10 INJECTION INTRAMUSCULAR; INTRAVENOUS ONCE
Status: COMPLETED | OUTPATIENT
Start: 2022-10-12 | End: 2022-10-12

## 2022-10-12 RX ADMIN — DEXAMETHASONE SODIUM PHOSPHATE 10 MG: 10 INJECTION INTRAMUSCULAR; INTRAVENOUS at 10:20

## 2022-10-12 RX ADMIN — SODIUM CHLORIDE 20 ML/HR: 9 INJECTION, SOLUTION INTRAVENOUS at 09:50

## 2022-10-12 RX ADMIN — DIPHENHYDRAMINE HYDROCHLORIDE 25 MG: 50 INJECTION, SOLUTION INTRAMUSCULAR; INTRAVENOUS at 10:25

## 2022-10-12 RX ADMIN — SODIUM CHLORIDE, PRESERVATIVE FREE 10 ML: 5 INJECTION INTRAVENOUS at 10:20

## 2022-10-12 RX ADMIN — SODIUM CHLORIDE, PRESERVATIVE FREE 10 ML: 5 INJECTION INTRAVENOUS at 10:09

## 2022-10-12 RX ADMIN — SODIUM CHLORIDE, PRESERVATIVE FREE 10 ML: 5 INJECTION INTRAVENOUS at 10:25

## 2022-10-12 RX ADMIN — ONDANSETRON 8 MG: 2 INJECTION INTRAMUSCULAR; INTRAVENOUS at 10:09

## 2022-10-12 RX ADMIN — PACLITAXEL 102 MG: 6 INJECTION, SOLUTION, CONCENTRATE INTRAVENOUS at 10:49

## 2022-10-12 RX ADMIN — FAMOTIDINE 20 MG: 10 INJECTION, SOLUTION INTRAVENOUS at 10:14

## 2022-10-12 RX ADMIN — POTASSIUM CHLORIDE 20 MEQ: 750 TABLET, EXTENDED RELEASE ORAL at 11:32

## 2022-10-12 NOTE — PROGRESS NOTES
[address] [facility] MEDICAL ONCOLOGY  [address] [facility]  Ethan Grimaldo [place] [address]  Dept: [phone] David Dex: [phone]  Attending progress note       Reason for Visit:   Cervical cancer. Referring Physician:  Ethan Grimaldo MD     PCP:  JULIO Watson     History of Present Illness:      Mrs. Jack Spaulding is a very pleasant 58-year-old lady, with a past medical history significant for hypertension, GERD, hypothyroidism, and skin cancer, who had presented with an abnormal Pap smear, she had a cervical biopsy done on 6/3/2022, revealing invasive nonkeratinizing squamous cell carcinoma, p16 positive, the patient was referred to Dr. Mary Jo Sequeira, she underwent on 7/19/2022 radical hysterectomy with bilateral salpingo-oophorectomy and bilateral pelvic lymphadenectomy, pathology was consistent with moderately differentiated squamous cell carcinoma, invading at least into the middle third, and close a portion of the outer third of the cervical stroma, maximum dimension of the tumor 2.5 cm, with depth of stromal invasion of 9 mm, lymphovascular invasion was present, focal, all margins were negative, the closest margin was the ectocervical, radial/circumferential and the distance from invasive carcinoma to closest margin is was 4 mm. High-grade squamous intraepithelial lesion was present at the margin. 2 premature lymph nodes were negative for metastasis, 5 pelvic lymph nodes were negative for metastasis. Final pathologic stage IB2. The patient had recovered well from the surgery. When she had blood work done on 6/23/2020, she was found to have a creatinine of 2, repeat creatinine from 7/20/2022 was 1.54. The patient was not aware of any kidney issues prior to that. On 9/7/2022, the patient was started on adjuvant chemo RT using weekly Taxol. The patient is feeling tired, she has intermittent diarrhea.     Review of Systems;  CONSTITUTIONAL: No fever, chills. Good appetite, feeling tired  ENMT: Eyes: No diplopia; Nose: No epistaxis. Mouth: No sore throat. RESPIRATORY: No hemoptysis, shortness of breath, cough. CARDIOVASCULAR: No chest pain, palpitations. GASTROINTESTINAL: Positive for nausea, no vomiting, no abdominal pain, no diarrhea. GENITOURINARY: No dysuria, urinary frequency, hematuria. NEURO: No syncope, presyncope, headache. Remainder:  ROS NEGATIVE     Past Medical History:  Past Medical History             Diagnosis Date    Abnormal Pap smear of cervix      Acid reflux      Arthritis      Hemorrhage 1982     s/p vaginal delivery     History of blood transfusion      Hypertension      Skin cancer      Thyroid disease               Patient Active Problem List   Diagnosis    Malignant neoplasm of overlapping sites of cervix [facility])         Past Surgical History:  Past Surgical History             Procedure Laterality Date    SKIN CANCER EXCISION        THYROID SURGERY         part of thyroid removal    THYROIDECTOMY, PARTIAL   [id number]            Family History:  Family History         Family History   Problem Relation Age of Onset    Cancer Mother           Lung    Lung Cancer Mother              Medications:  Reviewed and reconciled.      Social History:  Social History               Socioeconomic History    Marital status:        Spouse name: Not on file    Number of children: 1    Years of education: Not on file    Highest education level: Not on file   Occupational History    Not on file   Tobacco Use    Smoking status: Never    Smokeless tobacco: Never   Vaping Use    Vaping Use: Never used   Substance and Sexual Activity    Alcohol use: Never    Drug use: Never    Sexual activity: Not on file   Other Topics Concern    Not on file   Social History Narrative     ** Merged History Encounter **            Social Determinants of Health      Financial Resource Strain: Not on file   Food Insecurity: Not on file   Transportation Needs: Not on file   Physical Activity: Not on file   Stress: Not on file   Social Connections: Not on file   Intimate Partner Violence: Not on file   Housing Stability: Not on file            Allergies:  No Known Allergies     Physical Exam:  BP (!) 140/66   Pulse 100   Temp 97.6 °F (36.4 °C)   Resp (!) 99   Ht 5' 6\" (1.676 m)   Wt 206 lb 9.6 oz (93.7 kg)   BMI 33.35 kg/m²   GENERAL: Alert, oriented x 3, not in acute distress. HEENT: PERRLA; EOMI. Oropharynx clear. NECK: Supple. No palpable cervical or supraclavicular lymphadenopathy. LUNGS: Good air entry bilaterally. No wheezing, crackles or rhonchi. CARDIOVASCULAR: Regular rate. No murmurs, rubs or gallops. ABDOMEN: Incisions are healing nicely. Soft. Non-tender, non-distended. Positive bowel sounds. EXTREMITIES: Without clubbing, cyanosis, or edema. NEUROLOGIC: No focal deficits. ECOG PS 1        Impression/Plan:       Mrs. Tyrell Walsh is a very pleasant 71-year-old lady, with a past medical history significant for hypertension, GERD, hypothyroidism, and skin cancer, who had presented with an abnormal Pap smear, she had a cervical biopsy done on 6/3/2022, revealing invasive nonkeratinizing squamous cell carcinoma, p16 positive, the patient was referred to Dr. Nehal Rasmussen, she underwent on 7/19/2022 radical hysterectomy with bilateral salpingo-oophorectomy and bilateral pelvic lymphadenectomy, pathology was consistent with moderately differentiated squamous cell carcinoma, invading at least into the middle third, and close a portion of the outer third of the cervical stroma, maximum dimension of the tumor 2.5 cm, with depth of stromal invasion of 9 mm, lymphovascular invasion was present, focal, all margins were negative, the closest margin was the ectocervical, radial/circumferential and the distance from invasive carcinoma to closest margin is was 4 mm. High-grade squamous intraepithelial lesion was present at the margin.   2 premature lymph nodes were negative for metastasis, 5 pelvic lymph nodes were negative for metastasis. Final pathologic stage IB2. The patient has a newly diagnosed cervical squamous cell carcinoma, p16 positive, she has intermediate risk disease, diagnosis and prognosis were discussed with the patient. Due to the presence of lymphovascular invasion, middle one third stromal invasion and tumor size greater than 2 cm, adjuvant chemo RT is recommended, due to the CKD, did not recommend cisplatin, we decided to treat with weekly Taxol for radiosensitization. Side effects were reviewed with the patient. On 9/7/2022, the patient was started on adjuvant chemo RT using weekly Taxol. She has received 5 cycles to date, today 10/12/2022 labs reviewed, blood counts are adequate for treatment, her sodium is normal, she had hypokalemia yesterday with potassium of 2.9, she received potassium replacement, today it is 3.3, she will receive additional 20 mEq of oral potassium, proceed with Taxol, cycle #6. Will complete radiation therapy at the end of the week. She is scheduled to see nephrology month. Continue to hold Prinzide. RTC in 1 week. Thank you for allowing us to participate in the care of Mrs. Lien Limon.     Wilber Daley MD   HEMATOLOGY/MEDICAL [address] [place] [address] MEDICAL ONCOLOGY  [address] [place] [address]  Dept: [phone]: [phone]

## 2022-10-12 NOTE — PROGRESS NOTES
Mary Cuenca  10/12/2022  Wt Readings from Last 3 Encounters:   10/12/22 210 lb (95.3 kg)   10/05/22 208 lb 6.4 oz (94.5 kg)   10/05/22 209 lb (94.8 kg)     Body mass index is 33.89 kg/m². Treatment Area:Pelvis/ cervical cancer    Patient was seen today for weekly visit. Comfort Alteration  KPS:90%  Fatigue: Mild    Mucous Membrane Alteration  Drainage: No  Drainage Odor: No  Vaginal Bleeding: No    Nutritional Alteration  Anorexia: No  Nausea: No  Vomiting: No     Elimination Alterations  Constipation: no  Diarrhea:  yes/ taking Imodium /effective  Urinary Frequency/Urgency: No  Urinary Retention: No  Dysuria: No  Urinary Incontinence: No  Proctitis: No    Skin Alteration   Sensation:none    Radiation Dermatitis:  no    Emotional  Coping: effective    Sexuality Alteration  na    Injury, potential bleeding or infection: na    Lab Results   Component Value Date    WBC 3.8 (L) 10/11/2022     10/11/2022         BP (!) 146/71 Comment: asymptomatic  Pulse 77   Temp 97.9 °F (36.6 °C) (Tympanic)   Resp 18   Wt 210 lb (95.3 kg)   BMI 33.89 kg/m²   BP within normal range?  yes       Assessment/Plan:  Completed 25/27 fractions; 5448/5949 cGy    Jeronimo Cross RN

## 2022-10-12 NOTE — PROGRESS NOTES
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 2.5 cm in maximum dimension, intermediate risk, with 2 positive Sedlis factors (more than two third cervical stromal invasion, focal lymphovascular invasion)     SUBJECTIVE:   Brenda Murcia has now received 4400 cGy in 25 fractions directed to the pelvis. Past medical, surgical, social and family histories reviewed and updated as indicated. PAIN: no    ALLERGIES:  Patient has no known allergies. Current Outpatient Medications   Medication Sig Dispense Refill    lisinopril (PRINIVIL;ZESTRIL) 20 MG tablet       prochlorperazine (COMPAZINE) 10 MG tablet Take 1 tablet by mouth every 6 hours as needed (nausea) 60 tablet 1    ondansetron (ZOFRAN-ODT) 4 MG disintegrating tablet Place 2 tablets under the tongue every 8 hours as needed for Nausea or Vomiting 21 tablet 1    acetaminophen (TYLENOL) 500 MG tablet Take 2 tablets by mouth every 6 hours as needed for Pain 60 tablet 0    lisinopril-hydroCHLOROthiazide (PRINZIDE;ZESTORETIC) 20-12.5 MG per tablet Take 1 tablet by mouth daily      OMEPRAZOLE PO Take 20 mg by mouth daily      Levothyroxine Sodium (SYNTHROID PO) Take by mouth daily      NONFORMULARY at bedtime Sleeping Pill      lisinopril-hydroCHLOROthiazide (PRINZIDE;ZESTORETIC) 20-25 MG per tablet TAKE 1 TABLET BY MOUTH IN THE MORNING      omeprazole (PRILOSEC) 20 MG delayed release capsule TAKE 1 CAPSULE BY MOUTH IN THE MORNING BEFORE BREAKFAST      traZODone (DESYREL) 50 MG tablet       ALPRAZolam (XANAX) 0.25 MG tablet       levothyroxine (SYNTHROID) 50 MCG tablet Take 50 mcg by mouth Daily       No current facility-administered medications for this encounter.          OBJECTIVE:  Alert and fully ambulatory. Pleasant and conversant. Physical Examination:General appearance - alert, well appearing, and in no distress:  Constitutional: A well developed, well nourished 78 y.o. female who is alert, oriented, cooperative and in no apparent distress. HEENT:   Skin:  Warm and dry. No obvious rashes. Vitals:    10/12/22 0824   BP: (!) 146/71   Pulse: 77   Resp: 18   Temp: 97.9 °F (36.6 °C)   TempSrc: Tympanic   Weight: 210 lb (95.3 kg)       Wt Readings from Last 3 Encounters:   10/12/22 209 lb 12.8 oz (95.2 kg)   10/12/22 210 lb (95.3 kg)   10/05/22 208 lb 6.4 oz (94.5 kg)       ASSESSMENT/PLAN:     Patient is tolerating treatments well with expected toxicities. She will complete her treatment in 2 days at 48.6 [place]. Follow-up in 1 month. Current and planned dose reviewed. Goals of treatment and potential side effects were reviewed with the patient. Treatment imaging has been personally reviewed for accuracy and precision. Questions answered to apparent satisfaction. Treatments will continue as planned. Thank you for the opportunity to participate in multidisciplinary management of this remarkable and pleasant patient.       Jessica Silva MD    Department of Radiation Oncology  [address]) [facility]: [phone] ([facility]: [phone])  [address] [place] [address]: [phone] ([facility]: [phone])  [address] [facility]:  [phone] ([facility]:  [phone])

## 2022-10-13 ENCOUNTER — TELEPHONE (OUTPATIENT)
Dept: INFUSION THERAPY | Age: 67
End: 2022-10-13

## 2022-10-13 ENCOUNTER — HOSPITAL ENCOUNTER (OUTPATIENT)
Dept: RADIATION ONCOLOGY | Age: 67
Discharge: HOME | End: 2022-10-13
Payer: COMMERCIAL

## 2022-10-13 PROCEDURE — 77386 HC NTSTY MODUL RAD TX DLVR CPLX: CPT | Performed by: RADIOLOGY

## 2022-10-13 PROCEDURE — 77014 PR CT GUIDANCE PLACEMENT RAD THERAPY FIELDS: CPT | Performed by: RADIOLOGY

## 2022-10-13 NOTE — PROGRESS NOTES
Holly Spring  10/13/2022  5:06 PM          Current Outpatient Medications   Medication Sig Dispense Refill    lisinopril (PRINIVIL;ZESTRIL) 20 MG tablet       prochlorperazine (COMPAZINE) 10 MG tablet Take 1 tablet by mouth every 6 hours as needed (nausea) 60 tablet 1    ondansetron (ZOFRAN-ODT) 4 MG disintegrating tablet Place 2 tablets under the tongue every 8 hours as needed for Nausea or Vomiting 21 tablet 1    acetaminophen (TYLENOL) 500 MG tablet Take 2 tablets by mouth every 6 hours as needed for Pain 60 tablet 0    lisinopril-hydroCHLOROthiazide (PRINZIDE;ZESTORETIC) 20-12.5 MG per tablet Take 1 tablet by mouth daily      OMEPRAZOLE PO Take 20 mg by mouth daily      Levothyroxine Sodium (SYNTHROID PO) Take by mouth daily      NONFORMULARY at bedtime Sleeping Pill      lisinopril-hydroCHLOROthiazide (PRINZIDE;ZESTORETIC) 20-25 MG per tablet TAKE 1 TABLET BY MOUTH IN THE MORNING      omeprazole (PRILOSEC) 20 MG delayed release capsule TAKE 1 CAPSULE BY MOUTH IN THE MORNING BEFORE BREAKFAST      traZODone (DESYREL) 50 MG tablet       ALPRAZolam (XANAX) 0.25 MG tablet       levothyroxine (SYNTHROID) 50 MCG tablet Take 50 mcg by mouth Daily       No current facility-administered medications for this encounter. This is an up-to-date medication list.    Please take this list to your next care provider, and discard any previous medication lists.

## 2022-10-13 NOTE — TELEPHONE ENCOUNTER
Pts last three lab results faxed to Dr Yaima King office for upcoming new pt visit with fax confirmation received-SANJAY Roy RN

## 2022-10-14 ENCOUNTER — CLINICAL DOCUMENTATION (OUTPATIENT)
Dept: RADIATION ONCOLOGY | Age: 67
End: 2022-10-14

## 2022-10-14 ENCOUNTER — HOSPITAL ENCOUNTER (OUTPATIENT)
Dept: RADIATION ONCOLOGY | Age: 67
Discharge: HOME | End: 2022-10-14
Payer: COMMERCIAL

## 2022-10-14 PROCEDURE — 77386 HC NTSTY MODUL RAD TX DLVR CPLX: CPT | Performed by: RADIOLOGY

## 2022-10-14 PROCEDURE — 77014 PR CT GUIDANCE PLACEMENT RAD THERAPY FIELDS: CPT | Performed by: RADIOLOGY

## 2022-10-18 ENCOUNTER — HOSPITAL ENCOUNTER (OUTPATIENT)
Dept: INFUSION THERAPY | Age: 67
Discharge: HOME | End: 2022-10-18
Payer: COMMERCIAL

## 2022-10-18 DIAGNOSIS — C53.8 MALIGNANT NEOPLASM OF OVERLAPPING SITES OF CERVIX (HCC): ICD-10-CM

## 2022-10-18 LAB
ALBUMIN SERPL-MCNC: 4.2 G/DL (ref 3.5–5.2)
ALP BLD-CCNC: 65 U/L (ref 35–104)
ALT SERPL-CCNC: 25 U/L (ref 0–32)
ANION GAP SERPL CALCULATED.3IONS-SCNC: 11 MMOL/L (ref 7–16)
AST SERPL-CCNC: 24 U/L (ref 0–31)
BASOPHILS ABSOLUTE: 0.03 E9/L (ref 0–0.2)
BASOPHILS RELATIVE PERCENT: 0.7 % (ref 0–2)
BILIRUB SERPL-MCNC: 0.4 MG/DL (ref 0–1.2)
BUN BLDV-MCNC: 11 MG/DL (ref 6–23)
CALCIUM SERPL-MCNC: 8.4 MG/DL (ref 8.6–10.2)
CHLORIDE BLD-SCNC: 104 MMOL/L (ref 98–107)
CO2: 26 MMOL/L (ref 22–29)
CREAT SERPL-MCNC: 1.1 MG/DL (ref 0.5–1)
EOSINOPHILS ABSOLUTE: 0.24 E9/L (ref 0.05–0.5)
EOSINOPHILS RELATIVE PERCENT: 5.5 % (ref 0–6)
GFR SERPL CREATININE-BSD FRML MDRD: 55 ML/MIN/1.73
GLUCOSE BLD-MCNC: 106 MG/DL (ref 74–99)
HCT VFR BLD CALC: 32 % (ref 34–48)
HEMOGLOBIN: 10.6 G/DL (ref 11.5–15.5)
IMMATURE GRANULOCYTES #: 0.04 E9/L
IMMATURE GRANULOCYTES %: 0.9 % (ref 0–5)
LYMPHOCYTES ABSOLUTE: 0.43 E9/L (ref 1.5–4)
LYMPHOCYTES RELATIVE PERCENT: 9.8 % (ref 20–42)
MCH RBC QN AUTO: 30.9 PG (ref 26–35)
MCHC RBC AUTO-ENTMCNC: 33.1 % (ref 32–34.5)
MCV RBC AUTO: 93.3 FL (ref 80–99.9)
MONOCYTES ABSOLUTE: 0.35 E9/L (ref 0.1–0.95)
MONOCYTES RELATIVE PERCENT: 8 % (ref 2–12)
NEUTROPHILS ABSOLUTE: 3.31 E9/L (ref 1.8–7.3)
NEUTROPHILS RELATIVE PERCENT: 75.1 % (ref 43–80)
OVALOCYTES: ABNORMAL
PDW BLD-RTO: 14.9 FL (ref 11.5–15)
PLATELET # BLD: 266 E9/L (ref 130–450)
PMV BLD AUTO: 10.5 FL (ref 7–12)
POIKILOCYTES: ABNORMAL
POTASSIUM SERPL-SCNC: 3.1 MMOL/L (ref 3.5–5)
RBC # BLD: 3.43 E12/L (ref 3.5–5.5)
SODIUM BLD-SCNC: 141 MMOL/L (ref 132–146)
TOTAL PROTEIN: 6.8 G/DL (ref 6.4–8.3)
WBC # BLD: 4.4 E9/L (ref 4.5–11.5)

## 2022-10-18 PROCEDURE — 85025 COMPLETE CBC W/AUTO DIFF WBC: CPT

## 2022-10-18 PROCEDURE — 36415 COLL VENOUS BLD VENIPUNCTURE: CPT

## 2022-10-18 PROCEDURE — 80053 COMPREHEN METABOLIC PANEL: CPT

## 2022-10-19 ENCOUNTER — OFFICE VISIT (OUTPATIENT)
Dept: ONCOLOGY | Age: 67
End: 2022-10-19
Payer: COMMERCIAL

## 2022-10-19 ENCOUNTER — HOSPITAL ENCOUNTER (OUTPATIENT)
Dept: INFUSION THERAPY | Age: 67
Discharge: HOME | End: 2022-10-19
Payer: COMMERCIAL

## 2022-10-19 VITALS
DIASTOLIC BLOOD PRESSURE: 73 MMHG | BODY MASS INDEX: 33.94 KG/M2 | SYSTOLIC BLOOD PRESSURE: 179 MMHG | HEIGHT: 66 IN | OXYGEN SATURATION: 96 % | WEIGHT: 211.2 LBS | HEART RATE: 99 BPM | TEMPERATURE: 98 F

## 2022-10-19 VITALS — SYSTOLIC BLOOD PRESSURE: 138 MMHG | HEART RATE: 85 BPM | DIASTOLIC BLOOD PRESSURE: 70 MMHG | RESPIRATION RATE: 16 BRPM

## 2022-10-19 DIAGNOSIS — C53.8 MALIGNANT NEOPLASM OF OVERLAPPING SITES OF CERVIX (HCC): Primary | ICD-10-CM

## 2022-10-19 PROCEDURE — 99214 OFFICE O/P EST MOD 30 MIN: CPT | Performed by: INTERNAL MEDICINE

## 2022-10-19 PROCEDURE — 96360 HYDRATION IV INFUSION INIT: CPT

## 2022-10-19 PROCEDURE — 6370000000 HC RX 637 (ALT 250 FOR IP): Performed by: INTERNAL MEDICINE

## 2022-10-19 PROCEDURE — 2580000003 HC RX 258: Performed by: INTERNAL MEDICINE

## 2022-10-19 PROCEDURE — 1123F ACP DISCUSS/DSCN MKR DOCD: CPT | Performed by: INTERNAL MEDICINE

## 2022-10-19 RX ORDER — 0.9 % SODIUM CHLORIDE 0.9 %
1000 INTRAVENOUS SOLUTION INTRAVENOUS ONCE
Status: COMPLETED | OUTPATIENT
Start: 2022-10-19 | End: 2022-10-19

## 2022-10-19 RX ORDER — POTASSIUM CHLORIDE 750 MG/1
40 TABLET, EXTENDED RELEASE ORAL ONCE
Status: COMPLETED | OUTPATIENT
Start: 2022-10-19 | End: 2022-10-19

## 2022-10-19 RX ADMIN — SODIUM CHLORIDE 1000 ML: 9 INJECTION, SOLUTION INTRAVENOUS at 09:43

## 2022-10-19 RX ADMIN — POTASSIUM CHLORIDE 40 MEQ: 750 TABLET, EXTENDED RELEASE ORAL at 09:47

## 2022-10-19 NOTE — PROGRESS NOTES
[address] [facility] MEDICAL ONCOLOGY  [address] [place] [address]  [place] [address]  Dept: [phone]: [phone]  Attending progress note       Reason for Visit:   Cervical cancer. Referring Physician:  Vernon Esquivel MD     PCP:  JULIO Davis     History of Present Illness:      Mrs. Dianne Luke is a very pleasant 23-year-old lady, with a past medical history significant for hypertension, GERD, hypothyroidism, and skin cancer, who had presented with an abnormal Pap smear, she had a cervical biopsy done on 6/3/2022, revealing invasive nonkeratinizing squamous cell carcinoma, p16 positive, the patient was referred to Dr. Lady Gross, she underwent on 7/19/2022 radical hysterectomy with bilateral salpingo-oophorectomy and bilateral pelvic lymphadenectomy, pathology was consistent with moderately differentiated squamous cell carcinoma, invading at least into the middle third, and close a portion of the outer third of the cervical stroma, maximum dimension of the tumor 2.5 cm, with depth of stromal invasion of 9 mm, lymphovascular invasion was present, focal, all margins were negative, the closest margin was the ectocervical, radial/circumferential and the distance from invasive carcinoma to closest margin is was 4 mm. High-grade squamous intraepithelial lesion was present at the margin. 2 premature lymph nodes were negative for metastasis, 5 pelvic lymph nodes were negative for metastasis. Final pathologic stage IB2. The patient had recovered well from the surgery. When she had blood work done on 6/23/2020, she was found to have a creatinine of 2, repeat creatinine from 7/20/2022 was 1.54. The patient was not aware of any kidney issues prior to that. On 9/7/2022, the patient was started on adjuvant chemo RT using weekly Taxol. She completed the treatment on 10/14/2022. She still has diarrhea. Using Imodium as needed.   Feeling tired.    Review of Systems;  CONSTITUTIONAL: No fever, chills. Good appetite, feeling tired  ENMT: Eyes: No diplopia; Nose: No epistaxis. Mouth: No sore throat. RESPIRATORY: No hemoptysis, shortness of breath, cough. CARDIOVASCULAR: No chest pain, palpitations. GASTROINTESTINAL: Positive for nausea, no vomiting, no abdominal pain, positive for diarrhea. GENITOURINARY: No dysuria, urinary frequency, hematuria. NEURO: No syncope, presyncope, headache. Remainder:  ROS NEGATIVE     Past Medical History:  Past Medical History             Diagnosis Date    Abnormal Pap smear of cervix      Acid reflux      Arthritis      Hemorrhage 1982     s/p vaginal delivery     History of blood transfusion      Hypertension      Skin cancer      Thyroid disease               Patient Active Problem List   Diagnosis    Malignant neoplasm of overlapping sites of cervix [facility])         Past Surgical History:  Past Surgical History             Procedure Laterality Date    SKIN CANCER EXCISION        THYROID SURGERY         part of thyroid removal    THYROIDECTOMY, PARTIAL   [id number]            Family History:  Family History         Family History   Problem Relation Age of Onset    Cancer Mother           Lung    Lung Cancer Mother              Medications:  Reviewed and reconciled.      Social History:  Social History               Socioeconomic History    Marital status:        Spouse name: Not on file    Number of children: 1    Years of education: Not on file    Highest education level: Not on file   Occupational History    Not on file   Tobacco Use    Smoking status: Never    Smokeless tobacco: Never   Vaping Use    Vaping Use: Never used   Substance and Sexual Activity    Alcohol use: Never    Drug use: Never    Sexual activity: Not on file   Other Topics Concern    Not on file   Social History Narrative     ** Merged History Encounter **            Social Determinants of Health      Financial Resource Strain: Not on file   Food Insecurity: Not on file   Transportation Needs: Not on file   Physical Activity: Not on file   Stress: Not on file   Social Connections: Not on file   Intimate Partner Violence: Not on file   Housing Stability: Not on file            Allergies:  No Known Allergies     Physical Exam:  BP (!) 140/66   Pulse 100   Temp 97.6 °F (36.4 °C)   Resp (!) 99   Ht 5' 6\" (1.676 m)   Wt 206 lb 9.6 oz (93.7 kg)   BMI 33.35 kg/m²   GENERAL: Alert, oriented x 3, not in acute distress. HEENT: PERRLA; EOMI. Oropharynx clear. NECK: Supple. No palpable cervical or supraclavicular lymphadenopathy. LUNGS: Good air entry bilaterally. No wheezing, crackles or rhonchi. CARDIOVASCULAR: Regular rate. No murmurs, rubs or gallops. ABDOMEN: Incisions are healing nicely. Soft. Non-tender, non-distended. Positive bowel sounds. EXTREMITIES: Without clubbing, cyanosis, or edema. NEUROLOGIC: No focal deficits. ECOG PS 1        Impression/Plan:       Mrs. Jack Spaulding is a very pleasant 50-year-old lady, with a past medical history significant for hypertension, GERD, hypothyroidism, and skin cancer, who had presented with an abnormal Pap smear, she had a cervical biopsy done on 6/3/2022, revealing invasive nonkeratinizing squamous cell carcinoma, p16 positive, the patient was referred to Dr. Mary Jo Sequeira, she underwent on 7/19/2022 radical hysterectomy with bilateral salpingo-oophorectomy and bilateral pelvic lymphadenectomy, pathology was consistent with moderately differentiated squamous cell carcinoma, invading at least into the middle third, and close a portion of the outer third of the cervical stroma, maximum dimension of the tumor 2.5 cm, with depth of stromal invasion of 9 mm, lymphovascular invasion was present, focal, all margins were negative, the closest margin was the ectocervical, radial/circumferential and the distance from invasive carcinoma to closest margin is was 4 mm.   High-grade squamous intraepithelial lesion was present at the margin. 2 premature lymph nodes were negative for metastasis, 5 pelvic lymph nodes were negative for metastasis. Final pathologic stage IB2. The patient has a newly diagnosed cervical squamous cell carcinoma, p16 positive, she has intermediate risk disease, diagnosis and prognosis were discussed with the patient. Due to the presence of lymphovascular invasion, middle one third stromal invasion and tumor size greater than 2 cm, adjuvant chemo RT is recommended, due to the CKD, did not recommend cisplatin, we decided to treat with weekly Taxol for radiosensitization. Side effects were reviewed with the patient. On 9/7/2022, the patient was started on adjuvant chemo RT using weekly Taxol. She completed the treatment on 10/14/2022. The patient still has diarrhea, reviewed instructions for the management of the diarrhea, continue Imodium as needed, she will receive intravenous hydration, she has hypokalemia, replete potassium. She will schedule follow-up with Dr. Lady Gross, she is scheduled to see nephrology. Continue to hold Prinzide. RTC in 1 month. Thank you for allowing us to participate in the care of Mrs. Dianne Luke.     Claudio Jackson MD   HEMATOLOGY/MEDICAL [address] MEDICAL ONCOLOGY  [address] [place] [address]  Dept: [phone] Dex: [phone]

## 2022-10-25 NOTE — PROGRESS NOTES
Radiation Treatment Summary    Patient Name:  Marquita Solano,  1955,  78 y.o., female       Referring Physician: No referring provider defined for this encounter. PCP: JULIO Stinson       Diagnosis:pathological stage of pT1B2, pN0, M0, moderately differentiated squamous cell carcinoma of the cervix, SP open radical hysterectomy with lymph ryley sampling. 2.5 cm in maximum dimension, intermediate risk, with 2 positive Sedlis factors (more than two third cervical stromal invasion, focal lymphovascular invasion)        Recent History: The patient underwent adjuvant irradiation of the pelvis for squamous cell carcinoma of the cervix    Site Start TX [facility] AND [facility] ED Fractions Dose (cGy) Fx Dose (cGy) Technique   Pelvis 9/7/2022 10/14/2022 37 27 [id number] 180 IMRT                 Response/Tolerance: The patient tolerated the treatment reasonably well. Follow-up:  30-day in the office with Radiation Oncology      Thank you for the opportunity to participate in multidisciplinary management of this remarkable and pleasant patient.       Chavo Hurtado MD    Department of Radiation Oncology  [facility]) [facility]: [phone] (NOB: [phone])  [address] [place] [address]: [phone] (CVY: [phone])  [address] [facility]) [facility]:  [phone] (WEB:  [phone])

## 2022-11-16 ENCOUNTER — HOSPITAL ENCOUNTER (OUTPATIENT)
Dept: INFUSION THERAPY | Age: 67
Discharge: HOME | End: 2022-11-16
Payer: COMMERCIAL

## 2022-11-16 ENCOUNTER — OFFICE VISIT (OUTPATIENT)
Dept: ONCOLOGY | Age: 67
End: 2022-11-16
Payer: COMMERCIAL

## 2022-11-16 VITALS
HEIGHT: 66 IN | SYSTOLIC BLOOD PRESSURE: 177 MMHG | TEMPERATURE: 97.1 F | OXYGEN SATURATION: 98 % | WEIGHT: 206.6 LBS | DIASTOLIC BLOOD PRESSURE: 55 MMHG | HEART RATE: 88 BPM | BODY MASS INDEX: 33.2 KG/M2

## 2022-11-16 DIAGNOSIS — N18.9 CHRONIC KIDNEY DISEASE, UNSPECIFIED CKD STAGE: ICD-10-CM

## 2022-11-16 DIAGNOSIS — C53.8 MALIGNANT NEOPLASM OF OVERLAPPING SITES OF CERVIX (HCC): ICD-10-CM

## 2022-11-16 DIAGNOSIS — C53.8 MALIGNANT NEOPLASM OF OVERLAPPING SITES OF CERVIX (HCC): Primary | ICD-10-CM

## 2022-11-16 LAB
ALBUMIN SERPL-MCNC: 4.5 G/DL (ref 3.5–5.2)
ALP BLD-CCNC: 78 U/L (ref 35–104)
ALT SERPL-CCNC: 28 U/L (ref 0–32)
ANION GAP SERPL CALCULATED.3IONS-SCNC: 13 MMOL/L (ref 7–16)
AST SERPL-CCNC: 30 U/L (ref 0–31)
BASOPHILS ABSOLUTE: 0.06 E9/L (ref 0–0.2)
BASOPHILS RELATIVE PERCENT: 1.1 % (ref 0–2)
BILIRUB SERPL-MCNC: 0.6 MG/DL (ref 0–1.2)
BUN BLDV-MCNC: 14 MG/DL (ref 6–23)
CALCIUM SERPL-MCNC: 10 MG/DL (ref 8.6–10.2)
CHLORIDE BLD-SCNC: 100 MMOL/L (ref 98–107)
CO2: 24 MMOL/L (ref 22–29)
CREAT SERPL-MCNC: 1.1 MG/DL (ref 0.5–1)
EOSINOPHILS ABSOLUTE: 0.35 E9/L (ref 0.05–0.5)
EOSINOPHILS RELATIVE PERCENT: 6.2 % (ref 0–6)
GFR SERPL CREATININE-BSD FRML MDRD: 55 ML/MIN/1.73
GLUCOSE BLD-MCNC: 112 MG/DL (ref 74–99)
HCT VFR BLD CALC: 38.7 % (ref 34–48)
HEMOGLOBIN: 12.8 G/DL (ref 11.5–15.5)
IMMATURE GRANULOCYTES #: 0.02 E9/L
IMMATURE GRANULOCYTES %: 0.4 % (ref 0–5)
LYMPHOCYTES ABSOLUTE: 1.84 E9/L (ref 1.5–4)
LYMPHOCYTES RELATIVE PERCENT: 32.6 % (ref 20–42)
MCH RBC QN AUTO: 30.2 PG (ref 26–35)
MCHC RBC AUTO-ENTMCNC: 33.1 % (ref 32–34.5)
MCV RBC AUTO: 91.3 FL (ref 80–99.9)
MONOCYTES ABSOLUTE: 0.58 E9/L (ref 0.1–0.95)
MONOCYTES RELATIVE PERCENT: 10.3 % (ref 2–12)
NEUTROPHILS ABSOLUTE: 2.8 E9/L (ref 1.8–7.3)
NEUTROPHILS RELATIVE PERCENT: 49.4 % (ref 43–80)
PDW BLD-RTO: 14.9 FL (ref 11.5–15)
PLATELET # BLD: 307 E9/L (ref 130–450)
PMV BLD AUTO: 10.3 FL (ref 7–12)
POTASSIUM SERPL-SCNC: 4 MMOL/L (ref 3.5–5)
RBC # BLD: 4.24 E12/L (ref 3.5–5.5)
SODIUM BLD-SCNC: 137 MMOL/L (ref 132–146)
TOTAL PROTEIN: 7.1 G/DL (ref 6.4–8.3)
WBC # BLD: 5.7 E9/L (ref 4.5–11.5)

## 2022-11-16 PROCEDURE — 36415 COLL VENOUS BLD VENIPUNCTURE: CPT

## 2022-11-16 PROCEDURE — 99214 OFFICE O/P EST MOD 30 MIN: CPT | Performed by: INTERNAL MEDICINE

## 2022-11-16 PROCEDURE — 99212 OFFICE O/P EST SF 10 MIN: CPT

## 2022-11-16 PROCEDURE — 85025 COMPLETE CBC W/AUTO DIFF WBC: CPT

## 2022-11-16 PROCEDURE — 80053 COMPREHEN METABOLIC PANEL: CPT

## 2022-11-16 PROCEDURE — 1123F ACP DISCUSS/DSCN MKR DOCD: CPT | Performed by: INTERNAL MEDICINE

## 2022-11-16 RX ORDER — POTASSIUM CHLORIDE 750 MG/1
20 CAPSULE, EXTENDED RELEASE ORAL DAILY
COMMUNITY

## 2022-11-16 RX ORDER — FUROSEMIDE 20 MG/1
20 TABLET ORAL DAILY
COMMUNITY

## 2022-11-16 RX ORDER — CARVEDILOL 3.12 MG/1
3.12 TABLET ORAL 2 TIMES DAILY WITH MEALS
COMMUNITY

## 2022-11-16 NOTE — PROGRESS NOTES
[address] [facility] MEDICAL ONCOLOGY  [address] [place] [address]  [place] [address]  Dept: [phone]: [phone]  Attending progress note       Reason for Visit:   Cervical cancer. Referring Physician:  Jonelle Suggs MD     PCP:  JULIO Mao     History of Present Illness:      Mrs. Lien Limon is a very pleasant 71-year-old lady, with a past medical history significant for hypertension, GERD, hypothyroidism, and skin cancer, who had presented with an abnormal Pap smear, she had a cervical biopsy done on 6/3/2022, revealing invasive nonkeratinizing squamous cell carcinoma, p16 positive, the patient was referred to Dr. Henny Hudson, she underwent on 7/19/2022 radical hysterectomy with bilateral salpingo-oophorectomy and bilateral pelvic lymphadenectomy, pathology was consistent with moderately differentiated squamous cell carcinoma, invading at least into the middle third, and close a portion of the outer third of the cervical stroma, maximum dimension of the tumor 2.5 cm, with depth of stromal invasion of 9 mm, lymphovascular invasion was present, focal, all margins were negative, the closest margin was the ectocervical, radial/circumferential and the distance from invasive carcinoma to closest margin is was 4 mm. High-grade squamous intraepithelial lesion was present at the margin. 2 premature lymph nodes were negative for metastasis, 5 pelvic lymph nodes were negative for metastasis. Final pathologic stage IB2. The patient had recovered well from the surgery. When she had blood work done on 6/23/2020, she was found to have a creatinine of 2, repeat creatinine from 7/20/2022 was 1.54. The patient was not aware of any kidney issues prior to that. On 9/7/2022, the patient was started on adjuvant chemo RT using weekly Taxol. She completed the treatment on 10/14/2022.   She is feeling well, she denies any abdominal pain, diarrhea, nausea or vomiting, no vaginal bleed. Review of Systems;  CONSTITUTIONAL: No fever, chills. Good appetite, and energy level. ENMT: Eyes: No diplopia; Nose: No epistaxis. Mouth: No sore throat. RESPIRATORY: No hemoptysis, shortness of breath, cough. CARDIOVASCULAR: No chest pain, palpitations. GASTROINTESTINAL: Positive for nausea, no vomiting, no abdominal pain, positive for diarrhea. GENITOURINARY: No dysuria, urinary frequency, hematuria. NEURO: No syncope, presyncope, headache. Remainder:  ROS NEGATIVE     Past Medical History:  Past Medical History             Diagnosis Date    Abnormal Pap smear of cervix      Acid reflux      Arthritis      Hemorrhage 1982     s/p vaginal delivery     History of blood transfusion      Hypertension      Skin cancer      Thyroid disease               Patient Active Problem List   Diagnosis    Malignant neoplasm of overlapping sites of cervix [facility])         Past Surgical History:  Past Surgical History             Procedure Laterality Date    SKIN CANCER EXCISION        THYROID SURGERY         part of thyroid removal    THYROIDECTOMY, PARTIAL   [id number]            Family History:  Family History         Family History   Problem Relation Age of Onset    Cancer Mother           Lung    Lung Cancer Mother              Medications:  Reviewed and reconciled.      Social History:  Social History               Socioeconomic History    Marital status:        Spouse name: Not on file    Number of children: 1    Years of education: Not on file    Highest education level: Not on file   Occupational History    Not on file   Tobacco Use    Smoking status: Never    Smokeless tobacco: Never   Vaping Use    Vaping Use: Never used   Substance and Sexual Activity    Alcohol use: Never    Drug use: Never    Sexual activity: Not on file   Other Topics Concern    Not on file   Social History Narrative     ** Merged History Encounter **            Social Determinants of Health      Financial Resource Strain: Not on file   Food Insecurity: Not on file   Transportation Needs: Not on file   Physical Activity: Not on file   Stress: Not on file   Social Connections: Not on file   Intimate Partner Violence: Not on file   Housing Stability: Not on file            Allergies:  No Known Allergies     Physical Exam:  BP (!) 140/66   Pulse 100   Temp 97.6 °F (36.4 °C)   Resp (!) 99   Ht 5' 6\" (1.676 m)   Wt 206 lb 9.6 oz (93.7 kg)   BMI 33.35 kg/m²   GENERAL: Alert, oriented x 3, not in acute distress. HEENT: PERRLA; EOMI. Oropharynx clear. NECK: Supple. No palpable cervical or supraclavicular lymphadenopathy. LUNGS: Good air entry bilaterally. No wheezing, crackles or rhonchi. CARDIOVASCULAR: Regular rate. No murmurs, rubs or gallops. ABDOMEN: Incisions are healing nicely. Soft. Non-tender, non-distended. Positive bowel sounds. EXTREMITIES: Without clubbing, cyanosis, or edema. NEUROLOGIC: No focal deficits. ECOG PS 1        Impression/Plan:       Mrs. Melanie Colon is a very pleasant 70-year-old lady, with a past medical history significant for hypertension, GERD, hypothyroidism, and skin cancer, who had presented with an abnormal Pap smear, she had a cervical biopsy done on 6/3/2022, revealing invasive nonkeratinizing squamous cell carcinoma, p16 positive, the patient was referred to Dr. Jono Roche, she underwent on 7/19/2022 radical hysterectomy with bilateral salpingo-oophorectomy and bilateral pelvic lymphadenectomy, pathology was consistent with moderately differentiated squamous cell carcinoma, invading at least into the middle third, and close a portion of the outer third of the cervical stroma, maximum dimension of the tumor 2.5 cm, with depth of stromal invasion of 9 mm, lymphovascular invasion was present, focal, all margins were negative, the closest margin was the ectocervical, radial/circumferential and the distance from invasive carcinoma to closest margin is was 4 mm.   High-grade squamous intraepithelial lesion was present at the margin. 2 premature lymph nodes were negative for metastasis, 5 pelvic lymph nodes were negative for metastasis. Final pathologic stage IB2. The patient has a newly diagnosed cervical squamous cell carcinoma, p16 positive, she has intermediate risk disease, diagnosis and prognosis were discussed with the patient. Due to the presence of lymphovascular invasion, middle one third stromal invasion and tumor size greater than 2 cm, adjuvant chemo RT is recommended, due to the CKD, did not recommend cisplatin, we decided to treat with weekly Taxol for radiosensitization. Side effects were reviewed with the patient. On 9/7/2022, the patient was started on adjuvant chemo RT using weekly Taxol. She completed the treatment on 10/14/2022. The patient is doing well clinically, diarrhea had resolved, labs reviewed, blood counts had normalized, creatinine had improved to 1.1, she was seen by nephrology, Dr. Pritesh Farr. The patient will schedule a follow-up with Dr. Tawanda Downey. The surveillance guidelines were reviewed with the patient. RTC in 3 months. Thank you for allowing us to participate in the care of Mrs. Marvin Shaw.     Marita Perez MD   HEMATOLOGY/MEDICAL [address] MEDICAL ONCOLOGY  [address] [place] [address]  Dept: [phone] Dex: [phone]

## 2022-12-01 ENCOUNTER — TELEPHONE (OUTPATIENT)
Dept: CASE MANAGEMENT | Age: 67
End: 2022-12-01

## 2022-12-01 ENCOUNTER — TELEPHONE (OUTPATIENT)
Dept: RADIATION ONCOLOGY | Age: 67
End: 2022-12-01

## 2022-12-01 ENCOUNTER — HOSPITAL ENCOUNTER (OUTPATIENT)
Dept: RADIATION ONCOLOGY | Age: 67
Discharge: HOME | End: 2022-12-01

## 2022-12-01 VITALS
BODY MASS INDEX: 33.09 KG/M2 | DIASTOLIC BLOOD PRESSURE: 98 MMHG | WEIGHT: 205 LBS | RESPIRATION RATE: 18 BRPM | SYSTOLIC BLOOD PRESSURE: 180 MMHG | HEART RATE: 79 BPM | TEMPERATURE: 96.8 F

## 2022-12-01 DIAGNOSIS — C53.8 MALIGNANT NEOPLASM OF OVERLAPPING SITES OF CERVIX (HCC): Primary | ICD-10-CM

## 2022-12-01 NOTE — TELEPHONE ENCOUNTER
Met with patient during her follow up appointment with Dr. Faith Tim today. Patient completed her active treatment on 10/14/2022 for her Stage IB cervical cancer. States that she is doing well. Reports that her appetite is good and she is sleeping well. Patient completed 6 cycles of Taxol along with 27 fx of radiation treatment to the pelvis. Provided support and encouragement. Instructed patient in detail on her Cancer Treatment Summary and Survivorship Care Plan. Provided with a written copy. Aware that a copy will be sent to her PCP as well. Provided written copies of Patient Resource Booklet: Cancer Survivorship, Nutrition Following Cancer Treatment: Oncology Nutrition Guide, and Thriving and Surviving Post-Cancer Treatment: Nutritional and Emotional Support Services packet. Patient will follow up with Dr. Faith Tim in 6 months and Dr. Diana Valenzuela on 2/15/2023. Instructed patient to call with any questions or concerns. Verbally agreed. Patient appreciative of visit and information.  Jolie REYNOSON, RN, Oncology Nurse Navigator

## 2022-12-01 NOTE — TELEPHONE ENCOUNTER
This RN called Dr Luz Maria Meza (PCP) re: patient's elevated BP spoke to [facility]. I gave her 3 BP readings, she will notify patient's PCP.

## 2022-12-01 NOTE — PROGRESS NOTES
DEPARTMENT OF RADIATION ONCOLOGY   Follow up visit        2022    NAME:  Ruthie Barnes    :  1955 78 y.o. female     PCP: JULIO Villaseñor    DIAGNOSIS:  1. Malignant neoplasm of overlapping sites of cervix ([facility] [address]    pathological stage pT1B2, pN0, M0, moderately differentiated squamous cell carcinoma of the cervix, SP open radical hysterectomy with lymph ryley sampling. 2.5 cm in maximum dimension, intermediate risk, with 2 positive Sedlis factors (more than two third cervical stromal invasion, focal lymphovascular invasion) see concurrent chemoradiation with Taxol completed on 10/14/2022    STAGING: Cancer Staging  Malignant neoplasm of overlapping sites of cervix [facility])  Staging form: Cervix Uteri, AJCC Version 9  - Clinical stage from 2022: FIGO Stage IB2 (cT1b2, cN0, cM0) - Signed by Katelyn Moyer MD on 2022      RECENT HISTORY: Ruthie Barnes is now 1 month and a half  out from completion of RT to the pelvis. She is here today for routine follow-up. She is doing fine and she has no particular complaints except for some loss. She is taking biotin for that. Physical examination there are no signs of recurrence, parametria are free and rectovaginal septum is free as well    Past medical, surgical, social and family histories reviewed and updated as indicated. ALLERGIES:  Patient has no known allergies.        MEDICATIONS:   Current Outpatient Medications:     furosemide (LASIX) 20 MG tablet, Take 20 mg by mouth daily, Disp: , Rfl:     carvedilol (COREG) 3.125 MG tablet, Take 3.125 mg by mouth 2 times daily (with meals), Disp: , Rfl:     potassium chloride (MICRO-K) 10 MEQ extended release capsule, Take 20 mEq by mouth daily, Disp: , Rfl:     lisinopril-hydroCHLOROthiazide (PRINZIDE;ZESTORETIC) 20-12.5 MG per tablet, Take 1 tablet by mouth daily (Patient not taking: Reported on 2022), Disp: , Rfl:     OMEPRAZOLE PO, Take 20 mg by mouth daily (Patient not taking: Reported on 11/16/2022), Disp: , Rfl:     Levothyroxine Sodium (SYNTHROID PO), Take by mouth daily (Patient not taking: Reported on 11/16/2022), Disp: , Rfl:     NONFORMULARY, at bedtime Sleeping Pill (Patient not taking: Reported on 11/16/2022), Disp: , Rfl:     omeprazole (PRILOSEC) 20 MG delayed release capsule, TAKE 1 CAPSULE BY MOUTH IN THE MORNING BEFORE BREAKFAST, Disp: , Rfl:     traZODone (DESYREL) 50 MG tablet, , Disp: , Rfl:     ALPRAZolam (XANAX) 0.25 MG tablet, , Disp: , Rfl:     levothyroxine (SYNTHROID) 50 MCG tablet, Take 50 mcg by mouth Daily, Disp: , Rfl:     REVIEW OF SYSTEMS: Obtained from the patient, chart review and nursing assessment. 10-point ROS reviewed and negative except as per above. PHYSICAL EXAMINATION:    Vitals:    12/01/22 0824 12/01/22 0837   BP: (!) 193/95 (!) 180/98   Pulse: 79    Resp: 18    Temp: 96.8 °F (36 °C)    TempSrc: Tympanic    Weight: 205 lb (93 kg)        Wt Readings from Last 3 Encounters:   12/01/22 205 lb (93 kg)   11/16/22 206 lb 9.6 oz (93.7 kg)   10/19/22 211 lb 3.2 oz (95.8 kg)       General: Well developed, no acute distress. HEENT: Normocephalic and atraumatic. Moist mucosae. Neck: No thyromegaly. Trachea at midline. No lymphadenopathy. Cardiorespiratory: Unlabored breathing. No edema. Abdomen: Nontender and nondistended. No hepatosplenomegaly appreciated. Back: No tenderness to palpation. Neuro: CNs grossly intact. Spontaneous movement with all limbs. Psych: Normal affect. Alert & oriented x3. Skin: No abnormal lesions throughout. ASSESSMENT/PLAN:      The patient is doing very well, she has no signs of tumor relapse nor tumor progression. She is going to follow-up with Dr. Chun Mustafa in 3 months and she will come back here in 6 months.     Rey Lee MD    Department of Radiation Oncology  [facility]: [phone] (PARK: [phone])  [address] [place] [address]: [phone] (PZD: [phone])  [facility] [place]:  [phone] (ATT:  [phone])

## 2022-12-01 NOTE — PROGRESS NOTES
Flo Gentle  12/1/2022  8:36 AM      Vitals:    12/01/22 0824   BP: (!) 193/95   Pulse: 79   Resp: 18   Temp: 96.8 °F (36 °C)    : Wt Readings from Last 3 Encounters:   12/01/22 205 lb (93 kg)   11/16/22 206 lb 9.6 oz (93.7 kg)   10/19/22 211 lb 3.2 oz (95.8 kg)                Current Outpatient Medications:     furosemide (LASIX) 20 MG tablet, Take 20 mg by mouth daily, Disp: , Rfl:     carvedilol (COREG) 3.125 MG tablet, Take 3.125 mg by mouth 2 times daily (with meals), Disp: , Rfl:     potassium chloride (MICRO-K) 10 MEQ extended release capsule, Take 20 mEq by mouth daily, Disp: , Rfl:     lisinopril-hydroCHLOROthiazide (PRINZIDE;ZESTORETIC) 20-12.5 MG per tablet, Take 1 tablet by mouth daily (Patient not taking: Reported on 11/16/2022), Disp: , Rfl:     OMEPRAZOLE PO, Take 20 mg by mouth daily (Patient not taking: Reported on 11/16/2022), Disp: , Rfl:     Levothyroxine Sodium (SYNTHROID PO), Take by mouth daily (Patient not taking: Reported on 11/16/2022), Disp: , Rfl:     NONFORMULARY, at bedtime Sleeping Pill (Patient not taking: Reported on 11/16/2022), Disp: , Rfl:     omeprazole (PRILOSEC) 20 MG delayed release capsule, TAKE 1 CAPSULE BY MOUTH IN THE MORNING BEFORE BREAKFAST, Disp: , Rfl:     traZODone (DESYREL) 50 MG tablet, , Disp: , Rfl:     ALPRAZolam (XANAX) 0.25 MG tablet, , Disp: , Rfl:     levothyroxine (SYNTHROID) 50 MCG tablet, Take 50 mcg by mouth Daily, Disp: , Rfl:       Patient is seen today in follow up for Cervical cancer    Nu Silva is here today for a follow up after completed radiation therapy to pelvis  27 fractions; 4860 cGy completed 10/14/2022  She is alert and oriented x 4, denies pain, ambulatory without  assistance. Her BP is elevated, patient asymptomatic denies headache, she also states she is a little nervous this morning.   I took her BP x 4, including 1 with her wrist BP machine  from home 160/97 left wrist.  Patient states she forgot her BP medication last night and she did not take her BP medication yet this morning. I instructed patient to take her medication soon, she verbalizes understanding. I gave her a Medium vaginal dilator, some lubricant and hand written instructions how to use it. I gave her teaching how to use the vaginal dilator to start it 5 days/week  for a month then can decrease it to 2 to 3 days a week. She follows with Dr Anatoliy Thomas oncology, her last office visit was 11/19/22. No other complaints, Dr Ashlee Almanzar updated. FALLS RISK SCREENING ASSESSMENT    Instructions:  Assess the patient and enter the appropriate indicators that are present for fall risk identification. Total the numbers entered and assign a fall risk score from Table 2.  Reassess patient at a minimum every 12 weeks or with status change. Assessment   Date  12/1/2022     1. Mental Ability: confusion/cognitively impaired No - 0       2. Elimination Issues: incontinence, frequency No - 0       3. Ambulatory: use of assistive devices (walker, cane, off-loading devices), attached to equipment (IV pole, oxygen) No - 0     4. Sensory Limitations: dizziness, vertigo, impaired vision No - 0       5. Age 72 years or greater - 1       10. Medication: diuretics, strong analgesics, hypnotics, sedatives, antihypertensive agents   Yes - 3   7. Falls:  recent history of falls within the last 3 months (not to include slipping or tripping)   No - 0   TOTAL 4    If score of 4 or greater was education given? Yes       TABLE 2   Risk Score Risk Level Plan of Care   0-3 Little or  No Risk 1. Provide assistance as indicated for ambulation activities  2. Reorient confused/cognitively impaired patient  3. Call-light/bell within patient's reach  4. Chair/bed in low position, stretcher/bed with siderails up except when performing patient care activities  5.   Educate patient/family/caregiver on falls prevention  6.  Reassess in 12 weeks or with any noted change in patient condition which places them at a risk for a fall   4-6 Moderate Risk 1. Provide assistance as indicated for ambulation activities  2. Reorient confused/cognitively impaired patient  3. Call-light/bell within patient's reach  4. Chair/bed in low position, stretcher/bed with siderails up except when performing patient care activities  5. Educate patient/family/caregiver on falls prevention  6. Falls risk precaution (Yellow sticker Level II) placed on patient chart   7 or   Higher High Risk 1. Place patient in easily observable treatment room  2. Patient attended at all times by family member or staff  3. Provide assistance as indicated for ambulation activities  4. Reorient confused/cognitively impaired patient  5. Call-light/bell within patient's reach  6. Chair/bed in low position, stretcher/bed with siderails up except when performing patient care activities  7. Educate patient/family/caregiver on falls prevention  8. Falls risk precaution (Yellow sticker Level III) placed on patient chart           MALNUTRITION RISK SCREENING ASSESSMENT    12/1/2022   Patient:  Harden Slider  Sex:  female    Instructions:  Assess the patient and enter the appropriate indicators that are present for nutrition risk identification. Total the numbers entered and assign a risk score. Follow the appropriate action for total score listed below. Assessment   Date  12/1/2022     Have you lost weight without trying? 0- No     Have you been eating poorly because of a decreased appetite? 0- No   3. Do you have a diagnosis of head and neck cancer? 0- No                                                                                    TOTAL 0          Score of 0-1: No action  Score 2 or greater:   For Non-Diabetic Patient: Recommend adding Ensure Complete 2 x daily and provide patient with Ensure wellness bag with coupons  For Diabetic Patient: Recommend adding Glucerna Shake 2 x daily and provide patient with Glucerna Wellness bag with coupons  Route to the dietitian via Jose C Briones RN

## 2023-02-15 ENCOUNTER — OFFICE VISIT (OUTPATIENT)
Dept: ONCOLOGY | Age: 68
End: 2023-02-15
Payer: COMMERCIAL

## 2023-02-15 ENCOUNTER — HOSPITAL ENCOUNTER (OUTPATIENT)
Dept: INFUSION THERAPY | Age: 68
Discharge: HOME | End: 2023-02-15
Payer: COMMERCIAL

## 2023-02-15 VITALS
TEMPERATURE: 97.1 F | OXYGEN SATURATION: 97 % | SYSTOLIC BLOOD PRESSURE: 194 MMHG | HEART RATE: 68 BPM | HEIGHT: 66 IN | WEIGHT: 205.8 LBS | DIASTOLIC BLOOD PRESSURE: 84 MMHG | BODY MASS INDEX: 33.07 KG/M2

## 2023-02-15 DIAGNOSIS — C53.8 MALIGNANT NEOPLASM OF OVERLAPPING SITES OF CERVIX (HCC): ICD-10-CM

## 2023-02-15 DIAGNOSIS — C53.8 MALIGNANT NEOPLASM OF OVERLAPPING SITES OF CERVIX (HCC): Primary | ICD-10-CM

## 2023-02-15 LAB
ALBUMIN SERPL-MCNC: 4.1 G/DL (ref 3.5–5.2)
ALP BLD-CCNC: 69 U/L (ref 35–104)
ALT SERPL-CCNC: 20 U/L (ref 0–32)
ANION GAP SERPL CALCULATED.3IONS-SCNC: 11 MMOL/L (ref 7–16)
AST SERPL-CCNC: 22 U/L (ref 0–31)
BASOPHILS ABSOLUTE: 0.04 E9/L (ref 0–0.2)
BASOPHILS RELATIVE PERCENT: 0.8 % (ref 0–2)
BILIRUB SERPL-MCNC: 0.3 MG/DL (ref 0–1.2)
BUN BLDV-MCNC: 17 MG/DL (ref 6–23)
CALCIUM SERPL-MCNC: 9.4 MG/DL (ref 8.6–10.2)
CHLORIDE BLD-SCNC: 104 MMOL/L (ref 98–107)
CO2: 25 MMOL/L (ref 22–29)
CREAT SERPL-MCNC: 1 MG/DL (ref 0.5–1)
EOSINOPHILS ABSOLUTE: 0.31 E9/L (ref 0.05–0.5)
EOSINOPHILS RELATIVE PERCENT: 6.5 % (ref 0–6)
GFR SERPL CREATININE-BSD FRML MDRD: >60 ML/MIN/1.73
GLUCOSE BLD-MCNC: 113 MG/DL (ref 74–99)
HCT VFR BLD CALC: 39.8 % (ref 34–48)
HEMOGLOBIN: 13.5 G/DL (ref 11.5–15.5)
IMMATURE GRANULOCYTES #: 0.01 E9/L
IMMATURE GRANULOCYTES %: 0.2 % (ref 0–5)
LYMPHOCYTES ABSOLUTE: 1.26 E9/L (ref 1.5–4)
LYMPHOCYTES RELATIVE PERCENT: 26.6 % (ref 20–42)
MCH RBC QN AUTO: 29.6 PG (ref 26–35)
MCHC RBC AUTO-ENTMCNC: 33.9 % (ref 32–34.5)
MCV RBC AUTO: 87.3 FL (ref 80–99.9)
MONOCYTES ABSOLUTE: 0.43 E9/L (ref 0.1–0.95)
MONOCYTES RELATIVE PERCENT: 9.1 % (ref 2–12)
NEUTROPHILS ABSOLUTE: 2.69 E9/L (ref 1.8–7.3)
NEUTROPHILS RELATIVE PERCENT: 56.8 % (ref 43–80)
PDW BLD-RTO: 13.8 FL (ref 11.5–15)
PLATELET # BLD: 249 E9/L (ref 130–450)
PMV BLD AUTO: 10.4 FL (ref 7–12)
POTASSIUM SERPL-SCNC: 3.7 MMOL/L (ref 3.5–5)
RBC # BLD: 4.56 E12/L (ref 3.5–5.5)
SODIUM BLD-SCNC: 140 MMOL/L (ref 132–146)
TOTAL PROTEIN: 6.9 G/DL (ref 6.4–8.3)
WBC # BLD: 4.7 E9/L (ref 4.5–11.5)

## 2023-02-15 PROCEDURE — 80053 COMPREHEN METABOLIC PANEL: CPT

## 2023-02-15 PROCEDURE — 36415 COLL VENOUS BLD VENIPUNCTURE: CPT

## 2023-02-15 PROCEDURE — 85025 COMPLETE CBC W/AUTO DIFF WBC: CPT

## 2023-02-15 PROCEDURE — 1123F ACP DISCUSS/DSCN MKR DOCD: CPT | Performed by: INTERNAL MEDICINE

## 2023-02-15 PROCEDURE — 99214 OFFICE O/P EST MOD 30 MIN: CPT | Performed by: INTERNAL MEDICINE

## 2023-02-15 NOTE — PROGRESS NOTES
[address] [facility] MEDICAL ONCOLOGY  [address] [place], [place] [address] [address]  Dept: [phone]: [phone]  Attending progress note       Reason for Visit:   Cervical cancer. Referring Physician:  Mazin Grimm MD     PCP:  JULIO Nieto     History of Present Illness:      Mrs. Hira Powell is a very pleasant 69-year-old lady, with a past medical history significant for hypertension, GERD, hypothyroidism, and skin cancer, who had presented with an abnormal Pap smear, she had a cervical biopsy done on 6/3/2022, revealing invasive nonkeratinizing squamous cell carcinoma, p16 positive, the patient was referred to Dr. Norberto Angela, she underwent on 7/19/2022 radical hysterectomy with bilateral salpingo-oophorectomy and bilateral pelvic lymphadenectomy, pathology was consistent with moderately differentiated squamous cell carcinoma, invading at least into the middle third, and close a portion of the outer third of the cervical stroma, maximum dimension of the tumor 2.5 cm, with depth of stromal invasion of 9 mm, lymphovascular invasion was present, focal, all margins were negative, the closest margin was the ectocervical, radial/circumferential and the distance from invasive carcinoma to closest margin is was 4 mm. High-grade squamous intraepithelial lesion was present at the margin. 2 premature lymph nodes were negative for metastasis, 5 pelvic lymph nodes were negative for metastasis. Final pathologic stage IB2. The patient had recovered well from the surgery. When she had blood work done on 6/23/2020, she was found to have a creatinine of 2, repeat creatinine from 7/20/2022 was 1.54. The patient was not aware of any kidney issues prior to that. On 9/7/2022, the patient was started on adjuvant chemo RT using weekly Taxol. She completed the treatment on 10/14/2022.   She is feeling well, she denies any abdominal pain, diarrhea, nausea or vomiting, no vaginal bleed. She has hair loss. Review of Systems;  CONSTITUTIONAL: No fever, chills. Good appetite, and energy level. ENMT: Eyes: No diplopia; Nose: No epistaxis. Mouth: No sore throat. RESPIRATORY: No hemoptysis, shortness of breath, cough. CARDIOVASCULAR: No chest pain, palpitations. GASTROINTESTINAL: Positive for nausea, no vomiting, no abdominal pain, positive for diarrhea. GENITOURINARY: No dysuria, urinary frequency, hematuria. NEURO: No syncope, presyncope, headache. Remainder:  ROS NEGATIVE     Past Medical History:  Past Medical History             Diagnosis Date    Abnormal Pap smear of cervix      Acid reflux      Arthritis      Hemorrhage 1982     s/p vaginal delivery     History of blood transfusion      Hypertension      Skin cancer      Thyroid disease               Patient Active Problem List   Diagnosis    Malignant neoplasm of overlapping sites of cervix [facility])         Past Surgical History:  Past Surgical History             Procedure Laterality Date    SKIN CANCER EXCISION        THYROID SURGERY         part of thyroid removal    THYROIDECTOMY, PARTIAL   [id number]            Family History:  Family History         Family History   Problem Relation Age of Onset    Cancer Mother           Lung    Lung Cancer Mother              Medications:  Reviewed and reconciled.      Social History:  Social History               Socioeconomic History    Marital status:        Spouse name: Not on file    Number of children: 1    Years of education: Not on file    Highest education level: Not on file   Occupational History    Not on file   Tobacco Use    Smoking status: Never    Smokeless tobacco: Never   Vaping Use    Vaping Use: Never used   Substance and Sexual Activity    Alcohol use: Never    Drug use: Never    Sexual activity: Not on file   Other Topics Concern    Not on file   Social History Narrative     ** Merged History Encounter **            Social Determinants of Health      Financial Resource Strain: Not on file   Food Insecurity: Not on file   Transportation Needs: Not on file   Physical Activity: Not on file   Stress: Not on file   Social Connections: Not on file   Intimate Partner Violence: Not on file   Housing Stability: Not on file            Allergies:  No Known Allergies     Physical Exam:  BP (!) 140/66   Pulse 100   Temp 97.6 °F (36.4 °C)   Resp (!) 99   Ht 5' 6\" (1.676 m)   Wt 206 lb 9.6 oz (93.7 kg)   BMI 33.35 kg/m²   GENERAL: Alert, oriented x 3, not in acute distress. HEENT: PERRLA; EOMI. Oropharynx clear. NECK: Supple. No palpable cervical or supraclavicular lymphadenopathy. LUNGS: Good air entry bilaterally. No wheezing, crackles or rhonchi. CARDIOVASCULAR: Regular rate. No murmurs, rubs or gallops. ABDOMEN: Incisions are healing nicely. Soft. Non-tender, non-distended. Positive bowel sounds. EXTREMITIES: Without clubbing, cyanosis, or edema. NEUROLOGIC: No focal deficits. ECOG PS 1        Impression/Plan:       Mrs. Sharol Lombard is a very pleasant 52-year-old lady, with a past medical history significant for hypertension, GERD, hypothyroidism, and skin cancer, who had presented with an abnormal Pap smear, she had a cervical biopsy done on 6/3/2022, revealing invasive nonkeratinizing squamous cell carcinoma, p16 positive, the patient was referred to Dr. Herlinda Calvert, she underwent on 7/19/2022 radical hysterectomy with bilateral salpingo-oophorectomy and bilateral pelvic lymphadenectomy, pathology was consistent with moderately differentiated squamous cell carcinoma, invading at least into the middle third, and close a portion of the outer third of the cervical stroma, maximum dimension of the tumor 2.5 cm, with depth of stromal invasion of 9 mm, lymphovascular invasion was present, focal, all margins were negative, the closest margin was the ectocervical, radial/circumferential and the distance from invasive carcinoma to closest margin is was 4 mm. High-grade squamous intraepithelial lesion was present at the margin. 2 premature lymph nodes were negative for metastasis, 5 pelvic lymph nodes were negative for metastasis. Final pathologic stage IB2. The patient has a newly diagnosed cervical squamous cell carcinoma, p16 positive, she has intermediate risk disease, diagnosis and prognosis were discussed with the patient. Due to the presence of lymphovascular invasion, middle one third stromal invasion and tumor size greater than 2 cm, adjuvant chemo RT is recommended, due to the CKD, did not recommend cisplatin, we decided to treat with weekly Taxol for radiosensitization. Side effects were reviewed with the patient. On 9/7/2022, the patient was started on adjuvant chemo RT using weekly Taxol. She completed the treatment on 10/14/2022. The patient is doing well clinically, with no evidence of disease recurrence. She has hair loss, recommended biotin with keratin. She was seen by nephrology, Dr. Stephanie Barrios. Labs reviewed, creatinine had improved, he did not recommend follow-up with him. The patient is scheduled to see Dr. Saul Rees in June. The surveillance guidelines were reviewed with the patient. RTC in 3 months. Thank you for allowing us to participate in the care of Mrs. Valerie Foster.     Jelena Lr MD   HEMATOLOGY/MEDICAL [address] MEDICAL ONCOLOGY  [address] [place] [address]  Dept: [phone]: [phone]

## 2023-03-23 ENCOUNTER — HOSPITAL ENCOUNTER (OUTPATIENT)
Dept: RADIATION ONCOLOGY | Age: 68
Discharge: HOME | End: 2023-03-23
Payer: COMMERCIAL

## 2023-03-23 ENCOUNTER — TELEPHONE (OUTPATIENT)
Dept: RADIATION ONCOLOGY | Age: 68
End: 2023-03-23

## 2023-03-23 VITALS
HEART RATE: 82 BPM | RESPIRATION RATE: 18 BRPM | TEMPERATURE: 97.1 F | WEIGHT: 207.5 LBS | SYSTOLIC BLOOD PRESSURE: 170 MMHG | BODY MASS INDEX: 33.49 KG/M2 | DIASTOLIC BLOOD PRESSURE: 90 MMHG

## 2023-03-23 DIAGNOSIS — C53.8 MALIGNANT NEOPLASM OF OVERLAPPING SITES OF CERVIX (HCC): Primary | ICD-10-CM

## 2023-03-23 PROCEDURE — 99212 OFFICE O/P EST SF 10 MIN: CPT

## 2023-03-23 NOTE — TELEPHONE ENCOUNTER
This RN called Dr Matthew. Halina (PCP) spoke to Sidney re: patient's elevated BP, 176/94 (Dynamap) with a repeat of 170/90  manually, pt asymptomatic, Sidney will notify Dr Natalya Minor.

## 2023-03-23 NOTE — PROGRESS NOTES
DEPARTMENT OF RADIATION ONCOLOGY   Follow up visit        3/23/2023    NAME:  Haile Melchor    :  1955 78 y.o. female     PCP: JULIO Villaseñor    DIAGNOSIS:  1. Malignant neoplasm of overlapping sites of cervix ([facility] [address]        STAGING: Cancer Staging  Malignant neoplasm of overlapping sites of cervix [facility])  Staging form: Cervix Uteri, AJCC Version 9  - Clinical stage from 2022: FIGO Stage IB2 (cT1b2, cN0, cM0) - Signed by Adriano Go MD on 2022      RECENT HISTORY: Haile Melchor is now 5 months out from completion of RT to the pelvis. The patient is here for routine follow-up, she is doing very well, she has no vagina bleeding, no drainage, no urinary incontinence, no diarrhea, no rectal irritation. She has an upcoming appointment with her GYN unk Dr. Estrellita Chi in . Past medical, surgical, social and family histories reviewed and updated as indicated. ALLERGIES:  Patient has no known allergies.        MEDICATIONS:   Current Outpatient Medications:     furosemide (LASIX) 20 MG tablet, Take 20 mg by mouth daily, Disp: , Rfl:     carvedilol (COREG) 3.125 MG tablet, Take 3.125 mg by mouth 2 times daily (with meals), Disp: , Rfl:     potassium chloride (MICRO-K) 10 MEQ extended release capsule, Take 20 mEq by mouth daily, Disp: , Rfl:     lisinopril-hydroCHLOROthiazide (PRINZIDE;ZESTORETIC) 20-12.5 MG per tablet, Take 1 tablet by mouth daily, Disp: , Rfl:     OMEPRAZOLE PO, Take 20 mg by mouth daily, Disp: , Rfl:     Levothyroxine Sodium (SYNTHROID PO), Take by mouth daily, Disp: , Rfl:     NONFORMULARY, at bedtime Sleeping Pill, Disp: , Rfl:     omeprazole (PRILOSEC) 20 MG delayed release capsule, TAKE 1 CAPSULE BY MOUTH IN THE MORNING BEFORE BREAKFAST, Disp: , Rfl:     traZODone (DESYREL) 50 MG tablet, , Disp: , Rfl:     ALPRAZolam (XANAX) 0.25 MG tablet, , Disp: , Rfl:     levothyroxine (SYNTHROID) 50 MCG tablet, Take 50 mcg by mouth Daily, Disp: , Rfl:     REVIEW OF SYSTEMS:
Table 2.  Reassess patient at a minimum every 12 weeks or with status change. Assessment   Date  3/23/2023     1. Mental Ability: confusion/cognitively impaired No - 0       2. Elimination Issues: incontinence, frequency No - 0       3. Ambulatory: use of assistive devices (walker, cane, off-loading devices), attached to equipment (IV pole, oxygen) No - 0     4. Sensory Limitations: dizziness, vertigo, impaired vision No - 0       5. Age 72 years or greater - 1       10. Medication: diuretics, strong analgesics, hypnotics, sedatives, antihypertensive agents   Yes - 3   7. Falls:  recent history of falls within the last 3 months (not to include slipping or tripping)   No - 0   TOTAL 4    If score of 4 or greater was education given? Yes       TABLE 2   Risk Score Risk Level Plan of Care   0-3 Little or  No Risk 1. Provide assistance as indicated for ambulation activities  2. Reorient confused/cognitively impaired patient  3. Call-light/bell within patient's reach  4. Chair/bed in low position, stretcher/bed with siderails up except when performing patient care activities  5. Educate patient/family/caregiver on falls prevention  6.  Reassess in 12 weeks or with any noted change in patient condition which places them at a risk for a fall   4-6 Moderate Risk 1. Provide assistance as indicated for ambulation activities  2. Reorient confused/cognitively impaired patient  3. Call-light/bell within patient's reach  4. Chair/bed in low position, stretcher/bed with siderails up except when performing patient care activities  5. Educate patient/family/caregiver on falls prevention  6. Falls risk precaution (Yellow sticker Level II) placed on patient chart   7 or   Higher High Risk 1. Place patient in easily observable treatment room  2. Patient attended at all times by family member or staff  3. Provide assistance as indicated for ambulation activities  4.   Reorient confused/cognitively impaired

## 2023-05-17 ENCOUNTER — OFFICE VISIT (OUTPATIENT)
Dept: ONCOLOGY | Age: 68
End: 2023-05-17
Payer: COMMERCIAL

## 2023-05-17 ENCOUNTER — HOSPITAL ENCOUNTER (OUTPATIENT)
Dept: INFUSION THERAPY | Age: 68
Discharge: HOME | End: 2023-05-17
Payer: COMMERCIAL

## 2023-05-17 VITALS
WEIGHT: 210.8 LBS | HEART RATE: 62 BPM | HEIGHT: 66 IN | SYSTOLIC BLOOD PRESSURE: 167 MMHG | BODY MASS INDEX: 33.88 KG/M2 | DIASTOLIC BLOOD PRESSURE: 79 MMHG | TEMPERATURE: 97.7 F | OXYGEN SATURATION: 100 %

## 2023-05-17 DIAGNOSIS — N18.9 CHRONIC KIDNEY DISEASE, UNSPECIFIED CKD STAGE: ICD-10-CM

## 2023-05-17 DIAGNOSIS — C53.8 MALIGNANT NEOPLASM OF OVERLAPPING SITES OF CERVIX (HCC): ICD-10-CM

## 2023-05-17 DIAGNOSIS — C53.8 MALIGNANT NEOPLASM OF OVERLAPPING SITES OF CERVIX (HCC): Primary | ICD-10-CM

## 2023-05-17 LAB
ALBUMIN SERPL-MCNC: 4.5 G/DL (ref 3.5–5.2)
ALP SERPL-CCNC: 75 U/L (ref 35–104)
ALT SERPL-CCNC: 18 U/L (ref 0–32)
ANION GAP SERPL CALCULATED.3IONS-SCNC: 12 MMOL/L (ref 7–16)
AST SERPL-CCNC: 20 U/L (ref 0–31)
BASOPHILS # BLD: 0.03 E9/L (ref 0–0.2)
BASOPHILS NFR BLD: 0.6 % (ref 0–2)
BILIRUB SERPL-MCNC: 0.4 MG/DL (ref 0–1.2)
BUN SERPL-MCNC: 24 MG/DL (ref 6–23)
CALCIUM SERPL-MCNC: 9.6 MG/DL (ref 8.6–10.2)
CHLORIDE SERPL-SCNC: 106 MMOL/L (ref 98–107)
CO2 SERPL-SCNC: 25 MMOL/L (ref 22–29)
CREAT SERPL-MCNC: 1.4 MG/DL (ref 0.5–1)
EOSINOPHIL # BLD: 0.19 E9/L (ref 0.05–0.5)
EOSINOPHIL NFR BLD: 3.8 % (ref 0–6)
ERYTHROCYTE [DISTWIDTH] IN BLOOD BY AUTOMATED COUNT: 13.1 FL (ref 11.5–15)
GLUCOSE SERPL-MCNC: 116 MG/DL (ref 74–99)
HCT VFR BLD AUTO: 37 % (ref 34–48)
HGB BLD-MCNC: 12.5 G/DL (ref 11.5–15.5)
IMM GRANULOCYTES # BLD: 0.02 E9/L
IMM GRANULOCYTES NFR BLD: 0.4 % (ref 0–5)
LYMPHOCYTES # BLD: 1.16 E9/L (ref 1.5–4)
LYMPHOCYTES NFR BLD: 23.1 % (ref 20–42)
MCH RBC QN AUTO: 29.9 PG (ref 26–35)
MCHC RBC AUTO-ENTMCNC: 33.8 % (ref 32–34.5)
MCV RBC AUTO: 88.5 FL (ref 80–99.9)
MONOCYTES # BLD: 0.46 E9/L (ref 0.1–0.95)
MONOCYTES NFR BLD: 9.1 % (ref 2–12)
NEUTROPHILS # BLD: 3.17 E9/L (ref 1.8–7.3)
NEUTS SEG NFR BLD: 63 % (ref 43–80)
PLATELET # BLD AUTO: 233 E9/L (ref 130–450)
PMV BLD AUTO: 10.5 FL (ref 7–12)
POTASSIUM SERPL-SCNC: 3.5 MMOL/L (ref 3.5–5)
PROT SERPL-MCNC: 7 G/DL (ref 6.4–8.3)
RBC # BLD AUTO: 4.18 E12/L (ref 3.5–5.5)
SODIUM SERPL-SCNC: 143 MMOL/L (ref 132–146)
WBC # BLD: 5 E9/L (ref 4.5–11.5)

## 2023-05-17 PROCEDURE — 1123F ACP DISCUSS/DSCN MKR DOCD: CPT | Performed by: INTERNAL MEDICINE

## 2023-05-17 PROCEDURE — 36415 COLL VENOUS BLD VENIPUNCTURE: CPT

## 2023-05-17 PROCEDURE — 85025 COMPLETE CBC W/AUTO DIFF WBC: CPT

## 2023-05-17 PROCEDURE — 80053 COMPREHEN METABOLIC PANEL: CPT

## 2023-05-17 PROCEDURE — 99214 OFFICE O/P EST MOD 30 MIN: CPT | Performed by: INTERNAL MEDICINE

## 2023-05-17 RX ORDER — CARVEDILOL 6.25 MG/1
TABLET ORAL
COMMUNITY
Start: 2023-04-21

## 2023-05-17 NOTE — PROGRESS NOTES
[address] [facility] MEDICAL ONCOLOGY  [address] [place], [place] [address] [address]  Dept: [phone]: [phone]  Attending progress note       Reason for Visit:   Cervical cancer. Referring Physician:  Yoel Simons MD     PCP:  JULIO Tanner     History of Present Illness:      Mrs. Jim Raygoza is a very pleasant 41-year-old lady, with a past medical history significant for hypertension, GERD, hypothyroidism, and skin cancer, who had presented with an abnormal Pap smear, she had a cervical biopsy done on 6/3/2022, revealing invasive nonkeratinizing squamous cell carcinoma, p16 positive, the patient was referred to Dr. Shanika Grace, she underwent on 7/19/2022 radical hysterectomy with bilateral salpingo-oophorectomy and bilateral pelvic lymphadenectomy, pathology was consistent with moderately differentiated squamous cell carcinoma, invading at least into the middle third, and close a portion of the outer third of the cervical stroma, maximum dimension of the tumor 2.5 cm, with depth of stromal invasion of 9 mm, lymphovascular invasion was present, focal, all margins were negative, the closest margin was the ectocervical, radial/circumferential and the distance from invasive carcinoma to closest margin is was 4 mm. High-grade squamous intraepithelial lesion was present at the margin. 2 premature lymph nodes were negative for metastasis, 5 pelvic lymph nodes were negative for metastasis. Final pathologic stage IB2. The patient had recovered well from the surgery. When she had blood work done on 6/23/2020, she was found to have a creatinine of 2, repeat creatinine from 7/20/2022 was 1.54. The patient was not aware of any kidney issues prior to that. On 9/7/2022, the patient was started on adjuvant chemo RT using weekly Taxol. She completed the treatment on 10/14/2022.   She is feeling well, she denies any abdominal pain, diarrhea, nausea

## 2023-08-16 ENCOUNTER — HOSPITAL ENCOUNTER (OUTPATIENT)
Dept: INFUSION THERAPY | Age: 68
Discharge: HOME | End: 2023-08-16
Payer: COMMERCIAL

## 2023-08-16 ENCOUNTER — OFFICE VISIT (OUTPATIENT)
Dept: ONCOLOGY | Age: 68
End: 2023-08-16
Payer: COMMERCIAL

## 2023-08-16 VITALS
SYSTOLIC BLOOD PRESSURE: 169 MMHG | DIASTOLIC BLOOD PRESSURE: 79 MMHG | BODY MASS INDEX: 34.38 KG/M2 | TEMPERATURE: 97.3 F | WEIGHT: 213 LBS | OXYGEN SATURATION: 100 % | HEART RATE: 58 BPM

## 2023-08-16 DIAGNOSIS — C53.8 MALIGNANT NEOPLASM OF OVERLAPPING SITES OF CERVIX (HCC): Primary | ICD-10-CM

## 2023-08-16 DIAGNOSIS — N18.9 CHRONIC KIDNEY DISEASE, UNSPECIFIED CKD STAGE: ICD-10-CM

## 2023-08-16 DIAGNOSIS — C53.8 MALIGNANT NEOPLASM OF OVERLAPPING SITES OF CERVIX (HCC): ICD-10-CM

## 2023-08-16 LAB
ALBUMIN SERPL-MCNC: 4.7 G/DL (ref 3.5–5.2)
ALP SERPL-CCNC: 73 U/L (ref 35–104)
ALT SERPL-CCNC: 18 U/L (ref 0–32)
ANION GAP SERPL CALCULATED.3IONS-SCNC: 12 MMOL/L (ref 7–16)
AST SERPL-CCNC: 20 U/L (ref 0–31)
BASOPHILS # BLD: 0.05 K/UL (ref 0–0.2)
BASOPHILS NFR BLD: 1 % (ref 0–2)
BILIRUB SERPL-MCNC: 0.4 MG/DL (ref 0–1.2)
BUN SERPL-MCNC: 21 MG/DL (ref 6–23)
CALCIUM SERPL-MCNC: 10.1 MG/DL (ref 8.6–10.2)
CHLORIDE SERPL-SCNC: 103 MMOL/L (ref 98–107)
CO2 SERPL-SCNC: 25 MMOL/L (ref 22–29)
CREAT SERPL-MCNC: 1.3 MG/DL (ref 0.5–1)
EOSINOPHIL # BLD: 0.2 K/UL (ref 0.05–0.5)
EOSINOPHILS RELATIVE PERCENT: 4 % (ref 0–6)
ERYTHROCYTE [DISTWIDTH] IN BLOOD BY AUTOMATED COUNT: 13.7 % (ref 11.5–15)
GFR SERPL CREATININE-BSD FRML MDRD: 44 ML/MIN/1.73M2
GLUCOSE SERPL-MCNC: 117 MG/DL (ref 74–99)
HCT VFR BLD AUTO: 37.2 % (ref 34–48)
HGB BLD-MCNC: 12.6 G/DL (ref 11.5–15.5)
IMM GRANULOCYTES # BLD AUTO: <0.03 K/UL (ref 0–0.58)
IMM GRANULOCYTES NFR BLD: 0 % (ref 0–5)
LYMPHOCYTES NFR BLD: 1.19 K/UL (ref 1.5–4)
LYMPHOCYTES RELATIVE PERCENT: 24 % (ref 20–42)
MCH RBC QN AUTO: 30.4 PG (ref 26–35)
MCHC RBC AUTO-ENTMCNC: 33.9 G/DL (ref 32–34.5)
MCV RBC AUTO: 89.9 FL (ref 80–99.9)
MONOCYTES NFR BLD: 0.36 K/UL (ref 0.1–0.95)
MONOCYTES NFR BLD: 7 % (ref 2–12)
NEUTROPHILS NFR BLD: 64 % (ref 43–80)
NEUTS SEG NFR BLD: 3.23 K/UL (ref 1.8–7.3)
PLATELET # BLD AUTO: 231 K/UL (ref 130–450)
PMV BLD AUTO: 11 FL (ref 7–12)
POTASSIUM SERPL-SCNC: 4.3 MMOL/L (ref 3.5–5)
PROT SERPL-MCNC: 7.3 G/DL (ref 6.4–8.3)
RBC # BLD AUTO: 4.14 M/UL (ref 3.5–5.5)
SODIUM SERPL-SCNC: 140 MMOL/L (ref 132–146)
WBC OTHER # BLD: 5.1 K/UL (ref 4.5–11.5)

## 2023-08-16 PROCEDURE — 1123F ACP DISCUSS/DSCN MKR DOCD: CPT | Performed by: INTERNAL MEDICINE

## 2023-08-16 PROCEDURE — 99214 OFFICE O/P EST MOD 30 MIN: CPT | Performed by: INTERNAL MEDICINE

## 2023-08-16 PROCEDURE — 85025 COMPLETE CBC W/AUTO DIFF WBC: CPT

## 2023-08-16 PROCEDURE — 80053 COMPREHEN METABOLIC PANEL: CPT

## 2023-08-16 PROCEDURE — 36415 COLL VENOUS BLD VENIPUNCTURE: CPT

## 2023-08-16 NOTE — PROGRESS NOTES
[address] [place] [address] Drive [address] [place] [address] MEDICAL ONCOLOGY  [address]  [facility] [address]  Dept: [address] [facility]: [phone]  Attending progress note       Reason for Visit:   Cervical cancer. Referring Physician:  Jose G Alexandra MD     PCP:  JULIO Lucas     History of Present Illness:      Mrs. Ligia Stewart is a very pleasant 27-year-old lady, with a past medical history significant for hypertension, GERD, hypothyroidism, and skin cancer, who had presented with an abnormal Pap smear, she had a cervical biopsy done on 6/3/2022, revealing invasive nonkeratinizing squamous cell carcinoma, p16 positive, the patient was referred to Dr. Indiana Perez, she underwent on 7/19/2022 radical hysterectomy with bilateral salpingo-oophorectomy and bilateral pelvic lymphadenectomy, pathology was consistent with moderately differentiated squamous cell carcinoma, invading at least into the middle third, and close a portion of the outer third of the cervical stroma, maximum dimension of the tumor 2.5 cm, with depth of stromal invasion of 9 mm, lymphovascular invasion was present, focal, all margins were negative, the closest margin was the ectocervical, radial/circumferential and the distance from invasive carcinoma to closest margin is was 4 mm. High-grade squamous intraepithelial lesion was present at the margin. 2 premature lymph nodes were negative for metastasis, 5 pelvic lymph nodes were negative for metastasis. Final pathologic stage IB2. The patient had recovered well from the surgery. When she had blood work done on 6/23/2020, she was found to have a creatinine of 2, repeat creatinine from 7/20/2022 was 1.54. The patient was not aware of any kidney issues prior to that. On 9/7/2022, the patient was started on adjuvant chemo RT using weekly Taxol. She completed the treatment on 10/14/2022.   She is feeling well, she denies any abdominal pain, diarrhea, nausea

## 2023-10-09 ENCOUNTER — HOSPITAL ENCOUNTER (OUTPATIENT)
Dept: RADIATION ONCOLOGY | Age: 68
Discharge: HOME | End: 2023-10-09
Payer: COMMERCIAL

## 2023-10-09 VITALS
SYSTOLIC BLOOD PRESSURE: 170 MMHG | DIASTOLIC BLOOD PRESSURE: 90 MMHG | BODY MASS INDEX: 34.56 KG/M2 | HEART RATE: 57 BPM | WEIGHT: 214.13 LBS | TEMPERATURE: 97 F | RESPIRATION RATE: 18 BRPM

## 2023-10-09 DIAGNOSIS — C53.8 MALIGNANT NEOPLASM OF OVERLAPPING SITES OF CERVIX (HCC): Primary | ICD-10-CM

## 2023-10-09 PROCEDURE — 99212 OFFICE O/P EST SF 10 MIN: CPT | Performed by: RADIOLOGY

## 2023-10-09 PROCEDURE — 99212 OFFICE O/P EST SF 10 MIN: CPT

## 2023-10-09 RX ORDER — CARVEDILOL 25 MG/1
25 TABLET ORAL 2 TIMES DAILY WITH MEALS
COMMUNITY

## 2023-10-09 NOTE — PROGRESS NOTES
DEPARTMENT OF RADIATION ONCOLOGY   Follow up visit        10/9/2023    NAME:  Alfonso Vergara    :  1955 80 y.o. female     PCP: Chioma Howard PA    DIAGNOSIS:  1. Malignant neoplasm of overlapping sites of cervix ([address])    pathological stage of pT1B2, pN0, M0, moderately differentiated squamous cell carcinoma of the cervix, SP open radical hysterectomy with lymph ryley sampling. 2.5 cm in maximum dimension, intermediate risk, with 2 positive Sedlis factors (more than two third cervical stromal invasion, focal lymphovascular invasion)      STAGING: Cancer Staging   Malignant neoplasm of overlapping sites of cervix [facility])  Staging form: Cervix Uteri, AJCC Version 9  - Clinical stage from 2022: FIGO Stage IB2 (cT1b2, cN0, cM0) - Signed by Lavern Paiz MD on 2022      RECENT HISTORY: Alfonso Vergara is now 12 months out from completion of RT to the pelvis. The patient is here for routine follow-up. She has no particular complaints. No issues with her bowels. Past medical, surgical, social and family histories reviewed and updated as indicated. ALLERGIES:  Patient has no known allergies.        MEDICATIONS:   Current Outpatient Medications:     carvedilol (COREG) 25 MG tablet, Take 1 tablet by mouth 2 times daily (with meals), Disp: , Rfl:     furosemide (LASIX) 20 MG tablet, Take 1 tablet by mouth daily, Disp: , Rfl:     potassium chloride (MICRO-K) 10 MEQ extended release capsule, Take 2 capsules by mouth daily, Disp: , Rfl:     lisinopril-hydroCHLOROthiazide (PRINZIDE;ZESTORETIC) 20-12.5 MG per tablet, Take 1 tablet by mouth daily, Disp: , Rfl:     OMEPRAZOLE PO, Take 20 mg by mouth daily, Disp: , Rfl:     Levothyroxine Sodium (SYNTHROID PO), Take by mouth daily, Disp: , Rfl:     NONFORMULARY, at bedtime Sleeping Pill, Disp: , Rfl:     omeprazole (PRILOSEC) 20 MG delayed release capsule, TAKE 1 CAPSULE BY MOUTH IN THE MORNING BEFORE BREAKFAST, Disp: , Rfl:     traZODone (DESYREL)

## 2023-11-16 DIAGNOSIS — C53.8 MALIGNANT NEOPLASM OF OVERLAPPING SITES OF CERVIX (HCC): Primary | ICD-10-CM

## 2023-11-18 ENCOUNTER — HOSPITAL ENCOUNTER (OUTPATIENT)
Age: 68
Discharge: HOME | End: 2023-11-18
Payer: COMMERCIAL

## 2023-11-18 DIAGNOSIS — C53.8 MALIGNANT NEOPLASM OF OVERLAPPING SITES OF CERVIX (HCC): ICD-10-CM

## 2023-11-18 LAB
ALBUMIN SERPL-MCNC: 4.4 G/DL (ref 3.5–5.2)
ALP SERPL-CCNC: 75 U/L (ref 35–104)
ALT SERPL-CCNC: 16 U/L (ref 0–32)
ANION GAP SERPL CALCULATED.3IONS-SCNC: 12 MMOL/L (ref 7–16)
AST SERPL-CCNC: 19 U/L (ref 0–31)
BASOPHILS # BLD: 0.04 K/UL (ref 0–0.2)
BASOPHILS NFR BLD: 1 % (ref 0–2)
BILIRUB SERPL-MCNC: 0.5 MG/DL (ref 0–1.2)
BUN SERPL-MCNC: 26 MG/DL (ref 6–23)
CALCIUM SERPL-MCNC: 9.7 MG/DL (ref 8.6–10.2)
CHLORIDE SERPL-SCNC: 106 MMOL/L (ref 98–107)
CO2 SERPL-SCNC: 23 MMOL/L (ref 22–29)
CREAT SERPL-MCNC: 1.5 MG/DL (ref 0.5–1)
EOSINOPHIL # BLD: 0.21 K/UL (ref 0.05–0.5)
EOSINOPHILS RELATIVE PERCENT: 5 % (ref 0–6)
ERYTHROCYTE [DISTWIDTH] IN BLOOD BY AUTOMATED COUNT: 12.9 % (ref 11.5–15)
GFR SERPL CREATININE-BSD FRML MDRD: 37 ML/MIN/1.73M2
GLUCOSE SERPL-MCNC: 114 MG/DL (ref 74–99)
HCT VFR BLD AUTO: 36 % (ref 34–48)
HGB BLD-MCNC: 11.9 G/DL (ref 11.5–15.5)
IMM GRANULOCYTES # BLD AUTO: <0.03 K/UL (ref 0–0.58)
IMM GRANULOCYTES NFR BLD: 0 % (ref 0–5)
LYMPHOCYTES NFR BLD: 0.91 K/UL (ref 1.5–4)
LYMPHOCYTES RELATIVE PERCENT: 22 % (ref 20–42)
MCH RBC QN AUTO: 30.2 PG (ref 26–35)
MCHC RBC AUTO-ENTMCNC: 33.1 G/DL (ref 32–34.5)
MCV RBC AUTO: 91.4 FL (ref 80–99.9)
MONOCYTES NFR BLD: 0.3 K/UL (ref 0.1–0.95)
MONOCYTES NFR BLD: 7 % (ref 2–12)
NEUTROPHILS NFR BLD: 64 % (ref 43–80)
NEUTS SEG NFR BLD: 2.65 K/UL (ref 1.8–7.3)
PLATELET # BLD AUTO: 217 K/UL (ref 130–450)
PMV BLD AUTO: 11.3 FL (ref 7–12)
POTASSIUM SERPL-SCNC: 4.6 MMOL/L (ref 3.5–5)
PROT SERPL-MCNC: 7.4 G/DL (ref 6.4–8.3)
RBC # BLD AUTO: 3.94 M/UL (ref 3.5–5.5)
SODIUM SERPL-SCNC: 141 MMOL/L (ref 132–146)
WBC OTHER # BLD: 4.1 K/UL (ref 4.5–11.5)

## 2023-11-18 PROCEDURE — 80053 COMPREHEN METABOLIC PANEL: CPT

## 2023-11-18 PROCEDURE — 85025 COMPLETE CBC W/AUTO DIFF WBC: CPT

## 2023-11-18 PROCEDURE — 36415 COLL VENOUS BLD VENIPUNCTURE: CPT

## 2023-11-22 ENCOUNTER — HOSPITAL ENCOUNTER (OUTPATIENT)
Dept: INFUSION THERAPY | Age: 68
Discharge: HOME | End: 2023-11-22

## 2023-11-22 ENCOUNTER — OFFICE VISIT (OUTPATIENT)
Dept: ONCOLOGY | Age: 68
End: 2023-11-22
Payer: COMMERCIAL

## 2023-11-22 VITALS
DIASTOLIC BLOOD PRESSURE: 75 MMHG | OXYGEN SATURATION: 97 % | HEIGHT: 66 IN | SYSTOLIC BLOOD PRESSURE: 130 MMHG | HEART RATE: 87 BPM | WEIGHT: 215 LBS | BODY MASS INDEX: 34.55 KG/M2 | TEMPERATURE: 97.7 F

## 2023-11-22 DIAGNOSIS — N18.9 CHRONIC KIDNEY DISEASE, UNSPECIFIED CKD STAGE: ICD-10-CM

## 2023-11-22 DIAGNOSIS — C53.8 MALIGNANT NEOPLASM OF OVERLAPPING SITES OF CERVIX (HCC): Primary | ICD-10-CM

## 2023-11-22 PROCEDURE — 1123F ACP DISCUSS/DSCN MKR DOCD: CPT | Performed by: INTERNAL MEDICINE

## 2023-11-22 PROCEDURE — 99214 OFFICE O/P EST MOD 30 MIN: CPT | Performed by: INTERNAL MEDICINE

## 2023-11-22 NOTE — PROGRESS NOTES
stromal invasion of 9 mm, lymphovascular invasion was present, focal, all margins were negative, the closest margin was the ectocervical, radial/circumferential and the distance from invasive carcinoma to closest margin is was 4 mm. High-grade squamous intraepithelial lesion was present at the margin. 2 premature lymph nodes were negative for metastasis, 5 pelvic lymph nodes were negative for metastasis. Final pathologic stage IB2. The patient has a newly diagnosed cervical squamous cell carcinoma, p16 positive, she has intermediate risk disease, diagnosis and prognosis were discussed with the patient. Due to the presence of lymphovascular invasion, middle one third stromal invasion and tumor size greater than 2 cm, adjuvant chemo RT is recommended, due to the CKD, did not recommend cisplatin, we decided to treat with weekly Taxol for radiosensitization. Side effects were reviewed with the patient. On 9/7/2022, the patient was started on adjuvant chemo RT using weekly Taxol. She completed the treatment on 10/14/2022. The patient is doing well clinically, with no evidence of disease recurrence. Recommended surveillance, she was seen by Dr. Yas Yates on 10/9/2023, exam was negative for recurrence. The patient was seen by Dr. Karen Myles in June, she had a Pap smear that was negative for squamous intraepithelial lesions or malignancy. The surveillance guidelines were reviewed with the patient. CKD, the patient was seen by nephrology, labs reviewed, creatinine is 1.4, will continue to monitor, and the patient will keep adequate oral fluid intake. She has mild leukopenia, likely reactive, will monitor her counts. RTC in 3 months. Thank you for allowing us to participate in the care of Mrs. Annetta Jackson.     Beto Rose MD   HEMATOLOGY/MEDICAL ONCOLOGY  [facility] [address] [facility] MEDICAL ONCOLOGY  [address] [place] [id number]  Dept:

## 2024-02-13 ENCOUNTER — HOSPITAL ENCOUNTER (OUTPATIENT)
Dept: RADIATION ONCOLOGY | Age: 69
Discharge: HOME | End: 2024-02-13
Payer: COMMERCIAL

## 2024-02-13 VITALS
SYSTOLIC BLOOD PRESSURE: 133 MMHG | WEIGHT: 216.8 LBS | RESPIRATION RATE: 18 BRPM | DIASTOLIC BLOOD PRESSURE: 65 MMHG | HEART RATE: 72 BPM | TEMPERATURE: 96.7 F | BODY MASS INDEX: 34.99 KG/M2

## 2024-02-13 DIAGNOSIS — C53.8 MALIGNANT NEOPLASM OF OVERLAPPING SITES OF CERVIX (HCC): Primary | ICD-10-CM

## 2024-02-13 PROCEDURE — 99212 OFFICE O/P EST SF 10 MIN: CPT

## 2024-02-13 PROCEDURE — 99212 OFFICE O/P EST SF 10 MIN: CPT | Performed by: RADIOLOGY

## 2024-02-13 NOTE — PROGRESS NOTES
Jacqui Rojas  2/13/2024  8:07 AM      Vitals:    02/13/24 0804   Pulse: 72   Resp: 18   Temp: (!) 96.7 °F (35.9 °C)    :    Wt Readings from Last 3 Encounters:   02/13/24 98.3 kg (216 lb 12.8 oz)   11/22/23 97.5 kg (215 lb)   10/09/23 97.1 kg (214 lb 2 oz)                Current Outpatient Medications:     carvedilol (COREG) 25 MG tablet, Take 1 tablet by mouth 2 times daily (with meals), Disp: , Rfl:     furosemide (LASIX) 20 MG tablet, Take 1 tablet by mouth daily, Disp: , Rfl:     potassium chloride (MICRO-K) 10 MEQ extended release capsule, Take 2 capsules by mouth daily, Disp: , Rfl:     lisinopril-hydroCHLOROthiazide (PRINZIDE;ZESTORETIC) 20-12.5 MG per tablet, Take 1 tablet by mouth daily, Disp: , Rfl:     OMEPRAZOLE PO, Take 20 mg by mouth daily, Disp: , Rfl:     Levothyroxine Sodium (SYNTHROID PO), Take by mouth daily, Disp: , Rfl:     NONFORMULARY, at bedtime Sleeping Pill, Disp: , Rfl:     omeprazole (PRILOSEC) 20 MG delayed release capsule, TAKE 1 CAPSULE BY MOUTH IN THE MORNING BEFORE BREAKFAST, Disp: , Rfl:     traZODone (DESYREL) 50 MG tablet, , Disp: , Rfl:     ALPRAZolam (XANAX) 0.25 MG tablet, , Disp: , Rfl:     levothyroxine (SYNTHROID) 50 MCG tablet, Take 1 tablet by mouth Daily, Disp: , Rfl:     Patient is seen today in follow up for S/P radiation therapy to pelvis    Jacqui is here today for a follow up after completed radiation therapy to pelvis R/T cervical cancer.  She states she is doing well, denies pain, denies vaginal or rectal bleeding, denies diarrhea, denies vaginal drainage.  She states she had her mammogram already at [facility] and everything was good.  She follows with Dr Maya medical oncology, she will see her 3/13/24.  She also follows with Dr Monk she will see her sometime in June this year.  No other complaints, Dr Bull updated.        FALLS RISK SCREENING ASSESSMENT    Instructions:  Assess the patient and enter the appropriate indicators that are present for 
patient, chart review and nursing assessment. 10-point ROS reviewed and negative except as per above.    PHYSICAL EXAMINATION:    Vitals:    02/13/24 0804   BP: 133/65   Pulse: 72   Resp: 18   Temp: (!) 96.7 °F (35.9 °C)   TempSrc: Temporal   Weight: 98.3 kg (216 lb 12.8 oz)       Wt Readings from Last 3 Encounters:   02/13/24 98.3 kg (216 lb 12.8 oz)   11/22/23 97.5 kg (215 lb)   10/09/23 97.1 kg (214 lb 2 oz)       General: Well developed, no acute distress.  HEENT: Normocephalic and atraumatic. Moist mucosae.  Neck: No thyromegaly. Trachea at midline. No lymphadenopathy.  Cardiorespiratory: Unlabored breathing. No edema.  Abdomen: Nontender and nondistended. No hepatosplenomegaly appreciated.  Back: No tenderness to palpation.  Neuro: CNs grossly intact. Spontaneous movement with all limbs.  Psych: Normal affect. Alert & oriented x3.  Skin: No abnormal lesions throughout.    ASSESSMENT/PLAN:      The patient is doing very well with no signs of tumor relapse normal tumor progression, I recommended the use of a vagina moisturizer.  Next follow-up in 8 months.  In the meanwhile the patient will continue to follow-up with Dr. Cabrales.    Shira Bull MD    Department of Radiation Oncology  [facility]: [phone] (FAX: [phone])  [facility]: [phone] (FAX: [phone])  [facility]:  [phone] (FAX:  [phone])

## 2024-03-09 ENCOUNTER — HOSPITAL ENCOUNTER (OUTPATIENT)
Age: 69
Discharge: HOME | End: 2024-03-09
Payer: COMMERCIAL

## 2024-03-09 DIAGNOSIS — C53.8 MALIGNANT NEOPLASM OF OVERLAPPING SITES OF CERVIX (HCC): ICD-10-CM

## 2024-03-09 LAB
ALBUMIN SERPL-MCNC: 4.4 G/DL (ref 3.5–5.2)
ALP SERPL-CCNC: 76 U/L (ref 35–104)
ALT SERPL-CCNC: 17 U/L (ref 0–32)
ANION GAP SERPL CALCULATED.3IONS-SCNC: 13 MMOL/L (ref 7–16)
AST SERPL-CCNC: 19 U/L (ref 0–31)
BASOPHILS # BLD: 0.04 K/UL (ref 0–0.2)
BASOPHILS NFR BLD: 1 % (ref 0–2)
BILIRUB SERPL-MCNC: 0.5 MG/DL (ref 0–1.2)
BUN SERPL-MCNC: 17 MG/DL (ref 6–23)
CALCIUM SERPL-MCNC: 9.6 MG/DL (ref 8.6–10.2)
CHLORIDE SERPL-SCNC: 101 MMOL/L (ref 98–107)
CO2 SERPL-SCNC: 25 MMOL/L (ref 22–29)
CREAT SERPL-MCNC: 1.4 MG/DL (ref 0.5–1)
EOSINOPHIL # BLD: 0.17 K/UL (ref 0.05–0.5)
EOSINOPHILS RELATIVE PERCENT: 4 % (ref 0–6)
ERYTHROCYTE [DISTWIDTH] IN BLOOD BY AUTOMATED COUNT: 12.8 % (ref 11.5–15)
GFR SERPL CREATININE-BSD FRML MDRD: 41 ML/MIN/1.73M2
GLUCOSE SERPL-MCNC: 113 MG/DL (ref 74–99)
HCT VFR BLD AUTO: 36.8 % (ref 34–48)
HGB BLD-MCNC: 12.3 G/DL (ref 11.5–15.5)
IMM GRANULOCYTES # BLD AUTO: <0.03 K/UL (ref 0–0.58)
IMM GRANULOCYTES NFR BLD: 0 % (ref 0–5)
LYMPHOCYTES NFR BLD: 1.05 K/UL (ref 1.5–4)
LYMPHOCYTES RELATIVE PERCENT: 22 % (ref 20–42)
MCH RBC QN AUTO: 30.4 PG (ref 26–35)
MCHC RBC AUTO-ENTMCNC: 33.4 G/DL (ref 32–34.5)
MCV RBC AUTO: 91.1 FL (ref 80–99.9)
MONOCYTES NFR BLD: 0.38 K/UL (ref 0.1–0.95)
MONOCYTES NFR BLD: 8 % (ref 2–12)
NEUTROPHILS NFR BLD: 66 % (ref 43–80)
NEUTS SEG NFR BLD: 3.19 K/UL (ref 1.8–7.3)
PLATELET # BLD AUTO: 245 K/UL (ref 130–450)
PMV BLD AUTO: 10.5 FL (ref 7–12)
POTASSIUM SERPL-SCNC: 4.1 MMOL/L (ref 3.5–5)
PROT SERPL-MCNC: 7.3 G/DL (ref 6.4–8.3)
RBC # BLD AUTO: 4.04 M/UL (ref 3.5–5.5)
SODIUM SERPL-SCNC: 139 MMOL/L (ref 132–146)
WBC OTHER # BLD: 4.8 K/UL (ref 4.5–11.5)

## 2024-03-09 PROCEDURE — 36415 COLL VENOUS BLD VENIPUNCTURE: CPT

## 2024-03-09 PROCEDURE — 80053 COMPREHEN METABOLIC PANEL: CPT

## 2024-03-09 PROCEDURE — 85025 COMPLETE CBC W/AUTO DIFF WBC: CPT

## 2024-03-13 ENCOUNTER — HOSPITAL ENCOUNTER (OUTPATIENT)
Dept: INFUSION THERAPY | Age: 69
Discharge: HOME | End: 2024-03-13

## 2024-03-13 ENCOUNTER — OFFICE VISIT (OUTPATIENT)
Dept: ONCOLOGY | Age: 69
End: 2024-03-13
Payer: COMMERCIAL

## 2024-03-13 VITALS
BODY MASS INDEX: 34.94 KG/M2 | WEIGHT: 217.4 LBS | TEMPERATURE: 97.5 F | HEIGHT: 66 IN | SYSTOLIC BLOOD PRESSURE: 125 MMHG | DIASTOLIC BLOOD PRESSURE: 64 MMHG | OXYGEN SATURATION: 100 % | HEART RATE: 72 BPM

## 2024-03-13 DIAGNOSIS — N18.9 CHRONIC KIDNEY DISEASE, UNSPECIFIED CKD STAGE: ICD-10-CM

## 2024-03-13 DIAGNOSIS — C53.8 MALIGNANT NEOPLASM OF OVERLAPPING SITES OF CERVIX (HCC): Primary | ICD-10-CM

## 2024-03-13 PROCEDURE — 1123F ACP DISCUSS/DSCN MKR DOCD: CPT | Performed by: INTERNAL MEDICINE

## 2024-03-13 PROCEDURE — 99212 OFFICE O/P EST SF 10 MIN: CPT

## 2024-03-13 PROCEDURE — 99214 OFFICE O/P EST MOD 30 MIN: CPT | Performed by: INTERNAL MEDICINE

## 2024-03-13 NOTE — PROGRESS NOTES
[facility] MEDICAL ONCOLOGY  [address] [facility] [address]  Dept: [phone]  Loc: [phone]  Attending progress note       Reason for Visit:   Cervical cancer.     Referring Physician:  Cliff Jimenez MD     PCP:  JULIO Moyer     History of Present Illness:      Mrs. Rojas is a very pleasant 68-year-old lady, with a past medical history significant for hypertension, GERD, hypothyroidism, and skin cancer, who had presented with an abnormal Pap smear, she had a cervical biopsy done on 6/3/2022, revealing invasive nonkeratinizing squamous cell carcinoma, p16 positive, the patient was referred to Dr. Mejía, she underwent on 7/19/2022 radical hysterectomy with bilateral salpingo-oophorectomy and bilateral pelvic lymphadenectomy, pathology was consistent with moderately differentiated squamous cell carcinoma, invading at least into the middle third, and close a portion of the outer third of the cervical stroma, maximum dimension of the tumor 2.5 cm, with depth of stromal invasion of 9 mm, lymphovascular invasion was present, focal, all margins were negative, the closest margin was the ectocervical, radial/circumferential and the distance from invasive carcinoma to closest margin is was 4 mm.  High-grade squamous intraepithelial lesion was present at the margin.  2 premature lymph nodes were negative for metastasis, 5 pelvic lymph nodes were negative for metastasis.  Final pathologic stage IB2.  The patient had recovered well from the surgery.  When she had blood work done on 6/23/2020, she was found to have a creatinine of 2, repeat creatinine from 7/20/2022 was 1.54.  The patient was not aware of any kidney issues prior to that.     On 9/7/2022, the patient was started on adjuvant chemo RT using weekly Taxol.     She completed the treatment on 10/14/2022.     The patient was seen by Dr. Mejía in June, she had a Pap smear

## 2024-09-11 ENCOUNTER — HOSPITAL ENCOUNTER (OUTPATIENT)
Dept: INFUSION THERAPY | Age: 69
Discharge: HOME | End: 2024-09-11

## 2024-09-11 ENCOUNTER — HOSPITAL ENCOUNTER (OUTPATIENT)
Age: 69
Discharge: HOME | End: 2024-09-11
Payer: COMMERCIAL

## 2024-09-11 ENCOUNTER — OFFICE VISIT (OUTPATIENT)
Dept: ONCOLOGY | Age: 69
End: 2024-09-11
Payer: COMMERCIAL

## 2024-09-11 VITALS
TEMPERATURE: 97.7 F | HEIGHT: 66 IN | BODY MASS INDEX: 35.2 KG/M2 | SYSTOLIC BLOOD PRESSURE: 154 MMHG | OXYGEN SATURATION: 98 % | DIASTOLIC BLOOD PRESSURE: 76 MMHG | WEIGHT: 219 LBS | HEART RATE: 75 BPM

## 2024-09-11 DIAGNOSIS — N18.9 CHRONIC KIDNEY DISEASE, UNSPECIFIED CKD STAGE: ICD-10-CM

## 2024-09-11 DIAGNOSIS — C53.8 MALIGNANT NEOPLASM OF OVERLAPPING SITES OF CERVIX (HCC): ICD-10-CM

## 2024-09-11 DIAGNOSIS — C53.8 MALIGNANT NEOPLASM OF OVERLAPPING SITES OF CERVIX (HCC): Primary | ICD-10-CM

## 2024-09-11 LAB
ALBUMIN SERPL-MCNC: 4.5 G/DL (ref 3.5–5.2)
ALP SERPL-CCNC: 72 U/L (ref 35–104)
ALT SERPL-CCNC: 19 U/L (ref 0–32)
ANION GAP SERPL CALCULATED.3IONS-SCNC: 15 MMOL/L (ref 7–16)
AST SERPL-CCNC: 21 U/L (ref 0–31)
BASOPHILS # BLD: 0.05 K/UL (ref 0–0.2)
BASOPHILS NFR BLD: 1 % (ref 0–2)
BILIRUB SERPL-MCNC: 0.4 MG/DL (ref 0–1.2)
BUN SERPL-MCNC: 24 MG/DL (ref 6–23)
CALCIUM SERPL-MCNC: 9.8 MG/DL (ref 8.6–10.2)
CHLORIDE SERPL-SCNC: 103 MMOL/L (ref 98–107)
CO2 SERPL-SCNC: 23 MMOL/L (ref 22–29)
CREAT SERPL-MCNC: 1.5 MG/DL (ref 0.5–1)
EOSINOPHIL # BLD: 0.26 K/UL (ref 0.05–0.5)
EOSINOPHILS RELATIVE PERCENT: 5 % (ref 0–6)
ERYTHROCYTE [DISTWIDTH] IN BLOOD BY AUTOMATED COUNT: 13.5 % (ref 11.5–15)
GFR, ESTIMATED: 38 ML/MIN/1.73M2
GLUCOSE SERPL-MCNC: 109 MG/DL (ref 74–99)
HCT VFR BLD AUTO: 37.5 % (ref 34–48)
HGB BLD-MCNC: 12.2 G/DL (ref 11.5–15.5)
IMM GRANULOCYTES # BLD AUTO: <0.03 K/UL (ref 0–0.58)
IMM GRANULOCYTES NFR BLD: 0 % (ref 0–5)
LYMPHOCYTES NFR BLD: 1.28 K/UL (ref 1.5–4)
LYMPHOCYTES RELATIVE PERCENT: 24 % (ref 20–42)
MCH RBC QN AUTO: 30 PG (ref 26–35)
MCHC RBC AUTO-ENTMCNC: 32.5 G/DL (ref 32–34.5)
MCV RBC AUTO: 92.1 FL (ref 80–99.9)
MONOCYTES NFR BLD: 0.46 K/UL (ref 0.1–0.95)
MONOCYTES NFR BLD: 9 % (ref 2–12)
NEUTROPHILS NFR BLD: 61 % (ref 43–80)
NEUTS SEG NFR BLD: 3.25 K/UL (ref 1.8–7.3)
PLATELET # BLD AUTO: 246 K/UL (ref 130–450)
PMV BLD AUTO: 11 FL (ref 7–12)
POTASSIUM SERPL-SCNC: 4.5 MMOL/L (ref 3.5–5)
PROT SERPL-MCNC: 7.2 G/DL (ref 6.4–8.3)
RBC # BLD AUTO: 4.07 M/UL (ref 3.5–5.5)
SODIUM SERPL-SCNC: 141 MMOL/L (ref 132–146)
WBC OTHER # BLD: 5.3 K/UL (ref 4.5–11.5)

## 2024-09-11 PROCEDURE — 99214 OFFICE O/P EST MOD 30 MIN: CPT | Performed by: INTERNAL MEDICINE

## 2024-09-11 PROCEDURE — 80053 COMPREHEN METABOLIC PANEL: CPT

## 2024-09-11 PROCEDURE — 1123F ACP DISCUSS/DSCN MKR DOCD: CPT | Performed by: INTERNAL MEDICINE

## 2024-09-11 PROCEDURE — 36415 COLL VENOUS BLD VENIPUNCTURE: CPT

## 2024-09-11 PROCEDURE — 85025 COMPLETE CBC W/AUTO DIFF WBC: CPT

## 2024-09-11 PROCEDURE — 99212 OFFICE O/P EST SF 10 MIN: CPT

## 2024-12-17 ENCOUNTER — HOSPITAL ENCOUNTER (OUTPATIENT)
Dept: RADIATION ONCOLOGY | Age: 69
Discharge: HOME | End: 2024-12-17
Payer: COMMERCIAL

## 2024-12-17 VITALS
SYSTOLIC BLOOD PRESSURE: 139 MMHG | HEART RATE: 67 BPM | WEIGHT: 218.2 LBS | RESPIRATION RATE: 18 BRPM | TEMPERATURE: 98.2 F | BODY MASS INDEX: 35.22 KG/M2 | DIASTOLIC BLOOD PRESSURE: 71 MMHG

## 2024-12-17 DIAGNOSIS — C53.8 MALIGNANT NEOPLASM OF OVERLAPPING SITES OF CERVIX (HCC): Primary | ICD-10-CM

## 2024-12-17 PROCEDURE — 99212 OFFICE O/P EST SF 10 MIN: CPT

## 2025-02-26 DIAGNOSIS — C53.8 MALIGNANT NEOPLASM OF OVERLAPPING SITES OF CERVIX (HCC): Primary | ICD-10-CM

## 2025-04-22 ENCOUNTER — HOSPITAL ENCOUNTER (OUTPATIENT)
Dept: INFUSION THERAPY | Age: 70
Discharge: HOME | End: 2025-04-22
Payer: COMMERCIAL

## 2025-04-22 DIAGNOSIS — C53.8 MALIGNANT NEOPLASM OF OVERLAPPING SITES OF CERVIX (HCC): ICD-10-CM

## 2025-04-22 LAB
ALBUMIN SERPL-MCNC: 4.7 G/DL (ref 3.5–5.2)
ALP SERPL-CCNC: 75 U/L (ref 35–104)
ALT SERPL-CCNC: 18 U/L (ref 0–32)
ANION GAP SERPL CALCULATED.3IONS-SCNC: 16 MMOL/L (ref 7–16)
AST SERPL-CCNC: 19 U/L (ref 0–31)
BASOPHILS # BLD: 0.05 K/UL (ref 0–0.2)
BASOPHILS NFR BLD: 1 % (ref 0–2)
BILIRUB SERPL-MCNC: 0.4 MG/DL (ref 0–1.2)
BUN SERPL-MCNC: 25 MG/DL (ref 6–23)
CALCIUM SERPL-MCNC: 10.1 MG/DL (ref 8.6–10.2)
CHLORIDE SERPL-SCNC: 99 MMOL/L (ref 98–107)
CO2 SERPL-SCNC: 22 MMOL/L (ref 22–29)
CREAT SERPL-MCNC: 1.7 MG/DL (ref 0.5–1)
EOSINOPHIL # BLD: 0.31 K/UL (ref 0.05–0.5)
EOSINOPHILS RELATIVE PERCENT: 5 % (ref 0–6)
ERYTHROCYTE [DISTWIDTH] IN BLOOD BY AUTOMATED COUNT: 12.5 % (ref 11.5–15)
GFR, ESTIMATED: 33 ML/MIN/1.73M2
GLUCOSE SERPL-MCNC: 102 MG/DL (ref 74–99)
HCT VFR BLD AUTO: 36.9 % (ref 34–48)
HGB BLD-MCNC: 11.8 G/DL (ref 11.5–15.5)
IMM GRANULOCYTES # BLD AUTO: <0.03 K/UL (ref 0–0.58)
IMM GRANULOCYTES NFR BLD: 0 % (ref 0–5)
LYMPHOCYTES NFR BLD: 1.29 K/UL (ref 1.5–4)
LYMPHOCYTES RELATIVE PERCENT: 22 % (ref 20–42)
MCH RBC QN AUTO: 29.9 PG (ref 26–35)
MCHC RBC AUTO-ENTMCNC: 32 G/DL (ref 32–34.5)
MCV RBC AUTO: 93.4 FL (ref 80–99.9)
MONOCYTES NFR BLD: 0.42 K/UL (ref 0.1–0.95)
MONOCYTES NFR BLD: 7 % (ref 2–12)
NEUTROPHILS NFR BLD: 65 % (ref 43–80)
NEUTS SEG NFR BLD: 3.87 K/UL (ref 1.8–7.3)
PLATELET # BLD AUTO: 249 K/UL (ref 130–450)
PMV BLD AUTO: 11.5 FL (ref 7–12)
POTASSIUM SERPL-SCNC: 4.2 MMOL/L (ref 3.5–5)
PROT SERPL-MCNC: 7.6 G/DL (ref 6.4–8.3)
RBC # BLD AUTO: 3.95 M/UL (ref 3.5–5.5)
SODIUM SERPL-SCNC: 137 MMOL/L (ref 132–146)
WBC OTHER # BLD: 6 K/UL (ref 4.5–11.5)

## 2025-04-22 PROCEDURE — 80053 COMPREHEN METABOLIC PANEL: CPT

## 2025-04-22 PROCEDURE — 85025 COMPLETE CBC W/AUTO DIFF WBC: CPT

## 2025-04-22 PROCEDURE — 36415 COLL VENOUS BLD VENIPUNCTURE: CPT

## 2025-04-23 ENCOUNTER — OFFICE VISIT (OUTPATIENT)
Dept: ONCOLOGY | Age: 70
End: 2025-04-23
Payer: COMMERCIAL

## 2025-04-23 ENCOUNTER — TELEPHONE (OUTPATIENT)
Dept: ONCOLOGY | Age: 70
End: 2025-04-23

## 2025-04-23 ENCOUNTER — TELEPHONE (OUTPATIENT)
Dept: INFUSION THERAPY | Age: 70
End: 2025-04-23

## 2025-04-23 VITALS
HEART RATE: 63 BPM | DIASTOLIC BLOOD PRESSURE: 69 MMHG | OXYGEN SATURATION: 98 % | SYSTOLIC BLOOD PRESSURE: 140 MMHG | BODY MASS INDEX: 35.89 KG/M2 | WEIGHT: 215.4 LBS | HEIGHT: 65 IN | TEMPERATURE: 97.1 F

## 2025-04-23 DIAGNOSIS — N18.9 CHRONIC KIDNEY DISEASE, UNSPECIFIED CKD STAGE: ICD-10-CM

## 2025-04-23 DIAGNOSIS — C53.8 MALIGNANT NEOPLASM OF OVERLAPPING SITES OF CERVIX (HCC): Primary | ICD-10-CM

## 2025-04-23 PROCEDURE — 1123F ACP DISCUSS/DSCN MKR DOCD: CPT | Performed by: INTERNAL MEDICINE

## 2025-04-23 PROCEDURE — 99212 OFFICE O/P EST SF 10 MIN: CPT

## 2025-04-23 PROCEDURE — 99214 OFFICE O/P EST MOD 30 MIN: CPT | Performed by: INTERNAL MEDICINE

## 2025-04-23 NOTE — TELEPHONE ENCOUNTER
Patient called to request name of Nephrologist. Patient stated she will call back with nephrologists name.

## 2025-04-23 NOTE — PROGRESS NOTES
[facility] MEDICAL ONCOLOGY  [address] [facility] [address]  Dept: [phone]  Loc: [phone]  Attending progress note       Reason for Visit:   Cervical cancer.     Referring Physician:  Cliff Jimenez MD     PCP:  JULIO Moyer     History of Present Illness:      Mrs. Rojas is a very pleasant 69-year-old lady, with a past medical history significant for hypertension, GERD, hypothyroidism, and skin cancer, who had presented with an abnormal Pap smear, she had a cervical biopsy done on 6/3/2022, revealing invasive nonkeratinizing squamous cell carcinoma, p16 positive, the patient was referred to Dr. Mejía, she underwent on 7/19/2022 radical hysterectomy with bilateral salpingo-oophorectomy and bilateral pelvic lymphadenectomy, pathology was consistent with moderately differentiated squamous cell carcinoma, invading at least into the middle third, and close a portion of the outer third of the cervical stroma, maximum dimension of the tumor 2.5 cm, with depth of stromal invasion of 9 mm, lymphovascular invasion was present, focal, all margins were negative, the closest margin was the ectocervical, radial/circumferential and the distance from invasive carcinoma to closest margin is was 4 mm.  High-grade squamous intraepithelial lesion was present at the margin.  2 premature lymph nodes were negative for metastasis, 5 pelvic lymph nodes were negative for metastasis.  Final pathologic stage IB2.  The patient had recovered well from the surgery.  When she had blood work done on 6/23/2020, she was found to have a creatinine of 2, repeat creatinine from 7/20/2022 was 1.54.  The patient was not aware of any kidney issues prior to that.     On 9/7/2022, the patient was started on adjuvant chemo RT using weekly Taxol.     She completed the treatment on 10/14/2022.     The patient was seen by Dr. Mejía in June of 2024 for, she had a